# Patient Record
Sex: MALE | Race: WHITE | Employment: STUDENT | URBAN - METROPOLITAN AREA
[De-identification: names, ages, dates, MRNs, and addresses within clinical notes are randomized per-mention and may not be internally consistent; named-entity substitution may affect disease eponyms.]

---

## 2018-07-31 ENCOUNTER — OFFICE VISIT (OUTPATIENT)
Dept: FAMILY MEDICINE CLINIC | Age: 23
End: 2018-07-31
Payer: COMMERCIAL

## 2018-07-31 ENCOUNTER — HOSPITAL ENCOUNTER (OUTPATIENT)
Age: 23
Discharge: HOME | End: 2018-08-02
Payer: COMMERCIAL

## 2018-07-31 VITALS
DIASTOLIC BLOOD PRESSURE: 56 MMHG | HEART RATE: 79 BPM | BODY MASS INDEX: 25.2 KG/M2 | HEIGHT: 70 IN | RESPIRATION RATE: 16 BRPM | OXYGEN SATURATION: 98 % | SYSTOLIC BLOOD PRESSURE: 98 MMHG | TEMPERATURE: 97.7 F | WEIGHT: 176 LBS

## 2018-07-31 DIAGNOSIS — Z13.31 DEPRESSION SCREEN: ICD-10-CM

## 2018-07-31 DIAGNOSIS — Z00.00 ENCOUNTER FOR ANNUAL HEALTH EXAMINATION: Primary | ICD-10-CM

## 2018-07-31 DIAGNOSIS — E03.8 SUBCLINICAL HYPOTHYROIDISM: ICD-10-CM

## 2018-07-31 LAB
ALBUMIN SERPL-MCNC: 3.8 G/DL (ref 3.5–5.2)
ALP BLD-CCNC: 35 U/L (ref 40–129)
ALT SERPL-CCNC: 10 U/L (ref 0–40)
ANION GAP SERPL CALCULATED.3IONS-SCNC: 13 MMOL/L (ref 7–16)
AST SERPL-CCNC: 26 U/L (ref 0–39)
BASOPHILS ABSOLUTE: 0.05 E9/L (ref 0–0.2)
BASOPHILS RELATIVE PERCENT: 0.6 % (ref 0–2)
BILIRUB SERPL-MCNC: 0.3 MG/DL (ref 0–1.2)
BUN BLDV-MCNC: 22 MG/DL (ref 6–20)
CALCIUM SERPL-MCNC: 9.3 MG/DL (ref 8.6–10.2)
CHLORIDE BLD-SCNC: 103 MMOL/L (ref 98–107)
CHOLESTEROL, TOTAL: 152 MG/DL (ref 0–199)
CO2: 26 MMOL/L (ref 22–29)
CREAT SERPL-MCNC: 1.2 MG/DL (ref 0.7–1.2)
EOSINOPHILS ABSOLUTE: 0.23 E9/L (ref 0.05–0.5)
EOSINOPHILS RELATIVE PERCENT: 2.6 % (ref 0–6)
GFR AFRICAN AMERICAN: >60
GFR NON-AFRICAN AMERICAN: >60 ML/MIN/1.73
GLUCOSE BLD-MCNC: 78 MG/DL (ref 74–109)
HCT VFR BLD CALC: 51.4 % (ref 37–54)
HDLC SERPL-MCNC: 36 MG/DL
HEMOGLOBIN: 16.4 G/DL (ref 12.5–16.5)
IMMATURE GRANULOCYTES #: 0.03 E9/L
IMMATURE GRANULOCYTES %: 0.3 % (ref 0–5)
LDL CHOLESTEROL CALCULATED: 84 MG/DL (ref 0–99)
LYMPHOCYTES ABSOLUTE: 3.2 E9/L (ref 1.5–4)
LYMPHOCYTES RELATIVE PERCENT: 36.9 % (ref 20–42)
MCH RBC QN AUTO: 30.4 PG (ref 26–35)
MCHC RBC AUTO-ENTMCNC: 31.9 % (ref 32–34.5)
MCV RBC AUTO: 95.4 FL (ref 80–99.9)
MONOCYTES ABSOLUTE: 0.89 E9/L (ref 0.1–0.95)
MONOCYTES RELATIVE PERCENT: 10.3 % (ref 2–12)
NEUTROPHILS ABSOLUTE: 4.28 E9/L (ref 1.8–7.3)
NEUTROPHILS RELATIVE PERCENT: 49.3 % (ref 43–80)
PDW BLD-RTO: 11.9 FL (ref 11.5–15)
PLATELET # BLD: 259 E9/L (ref 130–450)
PMV BLD AUTO: 10.3 FL (ref 7–12)
POTASSIUM SERPL-SCNC: 4.9 MMOL/L (ref 3.5–5)
RBC # BLD: 5.39 E12/L (ref 3.8–5.8)
SODIUM BLD-SCNC: 142 MMOL/L (ref 132–146)
T4 TOTAL: 7.8 MCG/DL (ref 4.5–11.7)
TOTAL PROTEIN: 6 G/DL (ref 6.4–8.3)
TRIGL SERPL-MCNC: 162 MG/DL (ref 0–149)
TSH SERPL DL<=0.05 MIU/L-ACNC: 4.41 UIU/ML (ref 0.27–4.2)
VLDLC SERPL CALC-MCNC: 32 MG/DL
WBC # BLD: 8.7 E9/L (ref 4.5–11.5)

## 2018-07-31 PROCEDURE — 80061 LIPID PANEL: CPT

## 2018-07-31 PROCEDURE — G8419 CALC BMI OUT NRM PARAM NOF/U: HCPCS | Performed by: FAMILY MEDICINE

## 2018-07-31 PROCEDURE — 80053 COMPREHEN METABOLIC PANEL: CPT

## 2018-07-31 PROCEDURE — 36415 COLL VENOUS BLD VENIPUNCTURE: CPT | Performed by: FAMILY MEDICINE

## 2018-07-31 PROCEDURE — 84436 ASSAY OF TOTAL THYROXINE: CPT

## 2018-07-31 PROCEDURE — 85025 COMPLETE CBC W/AUTO DIFF WBC: CPT

## 2018-07-31 PROCEDURE — 99214 OFFICE O/P EST MOD 30 MIN: CPT | Performed by: FAMILY MEDICINE

## 2018-07-31 PROCEDURE — 84443 ASSAY THYROID STIM HORMONE: CPT

## 2018-07-31 PROCEDURE — G8427 DOCREV CUR MEDS BY ELIG CLIN: HCPCS | Performed by: FAMILY MEDICINE

## 2018-07-31 PROCEDURE — 1036F TOBACCO NON-USER: CPT | Performed by: FAMILY MEDICINE

## 2018-07-31 ASSESSMENT — PATIENT HEALTH QUESTIONNAIRE - PHQ9
2. FEELING DOWN, DEPRESSED OR HOPELESS: 0
SUM OF ALL RESPONSES TO PHQ QUESTIONS 1-9: 0
SUM OF ALL RESPONSES TO PHQ9 QUESTIONS 1 & 2: 0
1. LITTLE INTEREST OR PLEASURE IN DOING THINGS: 0

## 2018-07-31 NOTE — PROGRESS NOTES
ANNUAL PHYSICAL      Chief Complaint:   Denise Fountain is a 21 y.o. male who presents for complete physical examination  NO CURRENT PROBLEM OR SYMPTOMS. History of Present Illness:    LAST DENTAL EXAM-NOT SURE. LAST EYE EXAM- ABLE TO SEE WELL. Health Maintenance   Topic Date Due    HIV screen  01/26/2010    Flu vaccine (1) 09/01/2018    DTaP/Tdap/Td vaccine (2 - Td) 11/22/2026        There is no problem list on file for this patient. Past Medical History:   Diagnosis Date    Asthma     Environmental allergies     Latex allergy        Past Surgical History:   Procedure Laterality Date    APPENDECTOMY      TYMPANOSTOMY TUBE PLACEMENT         Current Outpatient Prescriptions   Medication Sig Dispense Refill    loratadine (CLARITIN) 10 MG tablet Take 10 mg by mouth daily as needed.  vitamin D (CHOLECALCIFEROL) 1000 UNIT TABS tablet Take 1,000 Units by mouth daily.  therapeutic multivitamin-minerals (THERAGRAN-M) tablet Take 1 tablet by mouth daily. No current facility-administered medications for this visit. Allergies   Allergen Reactions    Latex     Cefzil [Cefprozil]      Immunization History   Administered Date(s) Administered    Influenza, Intradermal, Preservative free 12/08/2015    Tdap (Boostrix, Adacel) 11/22/2016      Social History     Social History    Marital status: Single     Spouse name: N/A    Number of children: N/A    Years of education: N/A     Social History Main Topics    Smoking status: Never Smoker    Smokeless tobacco: Never Used    Alcohol use No    Drug use: No    Sexual activity: Not Currently     Other Topics Concern    None     Social History Narrative    None     No family history on file.                          Review Of Systems  Skin: no abnormal pigmentation, rash, scaling, itching, masses, hair or nail changes  Eyes: no blurring, diplopia, or eye pain  Ears/Nose/Throat: no hearing loss, tinnitus, vertigo, nosebleed, nasal congestion, rhinorrhea, sore throat  Respiratory: no cough, pleuritic chest pain, dyspnea, or wheezing  Cardiovascular: no angina, BRYANT, orthopnea, PND, palpitations, or claudication  Gastrointestinal: no nausea, vomiting, heartburn, diarrhea, constipation, bloating, or abdominal pain  Genitourinary: no urinary urgency, frequency, dysuria, nocturia, hesitancy, or incontinence  Musculoskeletal: no arthritis, arthralgia, myalgia, weakness, or morning stiffness  Neurologic: no paralysis, paresis, paresthesia, seizures, tremors, or headaches  Hematologic/Lymphatic/Immunologic: no anemia, abnormal bleeding/bruising, fever, chills, night sweats, swollen glands, or unexplained weight loss  Endocrine: no heat or cold intolerance and no polyphagia, polydipsia, or polyuria    PHYSICAL EXAMINATION:  BP (!) 98/56   Pulse 79   Temp 97.7 °F (36.5 °C) (Temporal)   Resp 16   Ht 5' 10\" (1.778 m)   Wt 176 lb (79.8 kg)   SpO2 98%   BMI 25.25 kg/m²   General appearance: healthy, alert, no distress  Skin: Skin color, texture, turgor normal. No rashes or lesions. No induration or tightening palpated. Head: Normocephalic. No masses, lesions, tenderness or abnormalities  Eyes: conjunctivae/corneas clear. PERRL, EOM's intact. Fundi are normal, no papilledema, hemorrhages or exudates. No AV crossing changes are noted. Ears: External ears normal. Canals clear. TM's clear bilaterally. Hearing normal to finger rub. Nose/Sinuses: Nares normal. Septum midline. Mucosa normal. No drainage or sinus tenderness. Oropharynx: Lips, mucosa, and tongue normal. Teeth and gums normal. Oropharynx clear with no exudate seen. Neck: Neck supple, and symmetric. No adenopathy. Thyroid symmetric, normal size, without nodule. Trachea is midline. Back: Back symmetric, no curvature. ROM normal. No CVA tenderness. Lungs: Good diaphragmatic excursion. Lungs clear to auscultation bilaterally.   No retractions or use of accessory muscles. No tactile fremitus. Normal chest percussion. Heart: PMI is not displaced, and no thrill noted. Regular rate and rhythm, with no rub, murmur or gallop noted. Abdomen: Abdomen soft, non-tender. BS normal. No masses, organomegaly. No hernia noted. Inguinal Area: Penis and Scrotum are normal.Inguinal rings are intact. Extremities: Extremities normal. No deformities, edema, or skin discoloration. No cyanosis or clubbing noted to the nails. Lymph: No lymphadenopathy of the neck or supraclavicular regions. Musculoskeletal: Spine ROM normal. Muscular strength intact. Peripheral pulses: radial=4/4, femoral=4/4, dorsalis pedis=4/4,  Neuro: Cranial nerves intact, Gait normal. Reflexes normal and symmetric, with no pathologic reflex noted. No focal weakness. Normal sensation to light touch. ASSESSMENT:     Diagnosis Orders   1. Encounter for annual health examination     2. Depression screen     3. Subclinical hypothyroidism  Comprehensive Metabolic Panel    CBC Auto Differential    Lipid Panel    TSH without Reflex    T4       Plan:    Patient Instructions   REGULAR  DIET. REGULAR EXERCISE  ADVISED. BLOOD DRAW FOR LAB. TESTING. NEXT APPOINTMENT IN 1 YEAR OR AS NEEDED               Return in about 1 year (around 7/31/2019) for [employer].       I have reviewed my findings and recommendations with Heather Scherer.     Dann Waldrop M.D

## 2018-07-31 NOTE — PATIENT INSTRUCTIONS
REGULAR  DIET. REGULAR EXERCISE  ADVISED. BLOOD DRAW FOR LAB. TESTING.   NEXT APPOINTMENT IN 1 YEAR OR AS NEEDED

## 2018-08-01 RX ORDER — LEVOTHYROXINE SODIUM 25 MCG
25 TABLET ORAL DAILY
Qty: 30 TABLET | Refills: 3 | Status: SHIPPED | OUTPATIENT
Start: 2018-08-01 | End: 2019-02-20 | Stop reason: SDUPTHER

## 2018-12-20 ENCOUNTER — OFFICE VISIT (OUTPATIENT)
Dept: FAMILY MEDICINE CLINIC | Age: 23
End: 2018-12-20
Payer: COMMERCIAL

## 2018-12-20 ENCOUNTER — HOSPITAL ENCOUNTER (OUTPATIENT)
Age: 23
Discharge: HOME | End: 2018-12-22
Payer: COMMERCIAL

## 2018-12-20 VITALS
OXYGEN SATURATION: 98 % | HEART RATE: 59 BPM | RESPIRATION RATE: 14 BRPM | SYSTOLIC BLOOD PRESSURE: 104 MMHG | TEMPERATURE: 97.8 F | WEIGHT: 180.12 LBS | BODY MASS INDEX: 25.79 KG/M2 | DIASTOLIC BLOOD PRESSURE: 54 MMHG | HEIGHT: 70 IN

## 2018-12-20 DIAGNOSIS — E78.00 HYPERCHOLESTEREMIA: ICD-10-CM

## 2018-12-20 DIAGNOSIS — E03.9 ACQUIRED HYPOTHYROIDISM: ICD-10-CM

## 2018-12-20 DIAGNOSIS — N52.8 OTHER MALE ERECTILE DYSFUNCTION: ICD-10-CM

## 2018-12-20 DIAGNOSIS — Z23 NEED FOR INFLUENZA VACCINATION: Primary | ICD-10-CM

## 2018-12-20 LAB
CHOLESTEROL, TOTAL: 167 MG/DL (ref 0–199)
HDLC SERPL-MCNC: 60 MG/DL
LDL CHOLESTEROL CALCULATED: 91 MG/DL (ref 0–99)
T4 TOTAL: 7.9 MCG/DL (ref 4.5–11.7)
TRIGL SERPL-MCNC: 79 MG/DL (ref 0–149)
TSH SERPL DL<=0.05 MIU/L-ACNC: 3.24 UIU/ML (ref 0.27–4.2)
VLDLC SERPL CALC-MCNC: 16 MG/DL

## 2018-12-20 PROCEDURE — 36415 COLL VENOUS BLD VENIPUNCTURE: CPT | Performed by: FAMILY MEDICINE

## 2018-12-20 PROCEDURE — 90471 IMMUNIZATION ADMIN: CPT | Performed by: FAMILY MEDICINE

## 2018-12-20 PROCEDURE — 90688 IIV4 VACCINE SPLT 0.5 ML IM: CPT | Performed by: FAMILY MEDICINE

## 2018-12-20 PROCEDURE — 80061 LIPID PANEL: CPT

## 2018-12-20 PROCEDURE — G8427 DOCREV CUR MEDS BY ELIG CLIN: HCPCS | Performed by: FAMILY MEDICINE

## 2018-12-20 PROCEDURE — 84443 ASSAY THYROID STIM HORMONE: CPT

## 2018-12-20 PROCEDURE — G8482 FLU IMMUNIZE ORDER/ADMIN: HCPCS | Performed by: FAMILY MEDICINE

## 2018-12-20 PROCEDURE — 1036F TOBACCO NON-USER: CPT | Performed by: FAMILY MEDICINE

## 2018-12-20 PROCEDURE — G8419 CALC BMI OUT NRM PARAM NOF/U: HCPCS | Performed by: FAMILY MEDICINE

## 2018-12-20 PROCEDURE — 84436 ASSAY OF TOTAL THYROXINE: CPT

## 2018-12-20 PROCEDURE — 99213 OFFICE O/P EST LOW 20 MIN: CPT | Performed by: FAMILY MEDICINE

## 2018-12-20 NOTE — PROGRESS NOTES
OFFICE PROGRESS NOTE      SUBJECTIVE:        Patient ID:   Claribel Perez is a 21 y.o. male who presents for   Chief Complaint   Patient presents with    Results     lab results    Thyroid Problem    Immunizations     flu vaccine requested           HPI:     FEELS GOOD. HAS PROBLEM MAINTAINING ERECTION FOR PERFORMANCE. WATCHING DIET GOOD. WALKING FOR EXERCISE. TAKING MEDICATIONS REGULARLY. FASTING FOR LAB WORK         Prior to Admission medications    Medication Sig Start Date End Date Taking? Authorizing Provider   SYNTHROID 25 MCG tablet Take 1 tablet by mouth Daily 8/1/18   Ninoska Rodriguez MD   loratadine (CLARITIN) 10 MG tablet Take 10 mg by mouth daily as needed. Historical Provider, MD   vitamin D (CHOLECALCIFEROL) 1000 UNIT TABS tablet Take 1,000 Units by mouth daily. Historical Provider, MD   therapeutic multivitamin-minerals (THERAGRAN-M) tablet Take 1 tablet by mouth daily.     Historical Provider, MD     Social History     Social History    Marital status: Single     Spouse name: N/A    Number of children: N/A    Years of education: N/A     Social History Main Topics    Smoking status: Never Smoker    Smokeless tobacco: Never Used    Alcohol use No    Drug use: No    Sexual activity: Not Currently     Other Topics Concern    None     Social History Narrative    None       I have reviewed León's allergies, medications, problem list, medical, social and family history and have updated as needed in the electronic medical record  Review Of Systems:    Skin: no abnormal pigmentation, rash, scaling, itching, masses, hair or nail changes  Eyes: no blurring, diplopia, or eye pain  Ears/Nose/Throat: no hearing loss, tinnitus, vertigo, nosebleed, nasal congestion, rhinorrhea, sore throat  Respiratory: no cough, pleuritic chest pain, dyspnea, or wheezing  Cardiovascular: no chest pain, angina, dyspnea on exertion, orthopnea, PND, palpitations, or claudication  Gastrointestinal: no nausea, vomiting, heartburn, diarrhea, constipation, bloating,  abdominal pain, or blood per rectum. Appetite is good  Genitourinary: no urinary urgency, frequency, dysuria, nocturia, hesitancy, or incontinence  Musculoskeletal:  Ambulating well  Neurologic: no paralysis, paresis, paresthesia, seizures, tremors, or headaches  Hematologic/Lymphatic/Immunologic: no anemia, abnormal bleeding/bruising, fever, chills, night sweats, swollen glands, or unexplained weight loss  Endocrine: no heat or cold intolerance and no polyphagia, polydipsia, or polyuria        OBJECTIVE:     VS:  Wt Readings from Last 3 Encounters:   12/20/18 180 lb 1.9 oz (81.7 kg)   07/31/18 176 lb (79.8 kg)   11/22/16 190 lb (86.2 kg)     Temp Readings from Last 3 Encounters:   12/20/18 97.8 °F (36.6 °C) (Temporal)   07/31/18 97.7 °F (36.5 °C) (Temporal)   11/22/16 98 °F (36.7 °C) (Oral)     BP Readings from Last 3 Encounters:   12/20/18 (!) 104/54   07/31/18 (!) 98/56   11/22/16 128/72        General appearance: Alert, Awake, Oriented times 3, no distress  Skin: Warm and dry  Head: Normocephalic. No masses, lesions or tenderness noted  Eyes: Conjunctivae appear normal. PERLE  Ears: External ears normal  Nose/Sinuses: Nares normal. Septum midline. Mucosa normal. No drainage  Oropharynx: Oropharynx clear with no exudate seen  Neck: Neck supple. No jugular venous distension, lymphadenopathy or thyromegaly Trachea midline  Lungs: Lungs clear to auscultation bilaterally. No ronchi, crackles or wheezes  Heart: S1 S2  Regular rate and rhythm. No rub, murmur or gallop  Abdomen: Abdomen soft, non-tender. BS normal. No masses, organomegaly  Extremities:NORMAL. Pedal pulses are normal.  Musculoskeletal: Muscular strength appears intact.  No joint effusion, tenderness, swelling or warmth  Neuro:  No focal motor or sensory deficits        ASSESSMENT     Patient Active Problem List    Diagnosis Date Noted    Acquired

## 2019-02-21 RX ORDER — LEVOTHYROXINE SODIUM 25 MCG
25 TABLET ORAL DAILY
Qty: 90 TABLET | Refills: 1 | Status: SHIPPED | OUTPATIENT
Start: 2019-02-21 | End: 2019-09-04

## 2019-09-04 ENCOUNTER — OFFICE VISIT (OUTPATIENT)
Dept: PRIMARY CARE CLINIC | Age: 24
End: 2019-09-04

## 2019-09-04 VITALS
RESPIRATION RATE: 15 BRPM | WEIGHT: 189 LBS | SYSTOLIC BLOOD PRESSURE: 122 MMHG | OXYGEN SATURATION: 100 % | DIASTOLIC BLOOD PRESSURE: 82 MMHG | HEART RATE: 67 BPM | HEIGHT: 70 IN | BODY MASS INDEX: 27.06 KG/M2

## 2019-09-04 DIAGNOSIS — H65.92 LEFT NON-SUPPURATIVE OTITIS MEDIA: Primary | ICD-10-CM

## 2019-09-04 PROCEDURE — 99213 OFFICE O/P EST LOW 20 MIN: CPT | Performed by: NURSE PRACTITIONER

## 2019-09-04 RX ORDER — DOXYCYCLINE HYCLATE 100 MG/1
100 CAPSULE ORAL 2 TIMES DAILY
Qty: 20 CAPSULE | Refills: 0 | Status: SHIPPED | OUTPATIENT
Start: 2019-09-04 | End: 2019-09-14

## 2019-09-04 RX ORDER — PSEUDOEPHEDRINE HYDROCHLORIDE 30 MG/1
30 TABLET ORAL EVERY 6 HOURS PRN
Qty: 30 TABLET | Refills: 0 | Status: SHIPPED | OUTPATIENT
Start: 2019-09-04 | End: 2020-09-03

## 2019-09-04 ASSESSMENT — ENCOUNTER SYMPTOMS
VOMITING: 0
SINUS PRESSURE: 0
SORE THROAT: 0
DIARRHEA: 0
SINUS PAIN: 0
FACIAL SWELLING: 0
NAUSEA: 0
CONSTIPATION: 0
SHORTNESS OF BREATH: 0
WHEEZING: 0
COUGH: 0

## 2019-10-31 ENCOUNTER — APPOINTMENT (OUTPATIENT)
Dept: GENERAL RADIOLOGY | Age: 24
End: 2019-10-31
Payer: COMMERCIAL

## 2019-10-31 ENCOUNTER — HOSPITAL ENCOUNTER (EMERGENCY)
Age: 24
Discharge: HOME | End: 2019-10-31
Payer: COMMERCIAL

## 2019-10-31 VITALS
BODY MASS INDEX: 26.36 KG/M2 | OXYGEN SATURATION: 97 % | RESPIRATION RATE: 16 BRPM | SYSTOLIC BLOOD PRESSURE: 134 MMHG | TEMPERATURE: 98.7 F | DIASTOLIC BLOOD PRESSURE: 90 MMHG | HEIGHT: 71 IN | HEART RATE: 78 BPM

## 2019-10-31 DIAGNOSIS — S93.401A SPRAIN OF RIGHT ANKLE, UNSPECIFIED LIGAMENT, INITIAL ENCOUNTER: Primary | ICD-10-CM

## 2019-10-31 PROCEDURE — 99283 EMERGENCY DEPT VISIT LOW MDM: CPT

## 2019-10-31 PROCEDURE — 73630 X-RAY EXAM OF FOOT: CPT

## 2019-10-31 PROCEDURE — 73610 X-RAY EXAM OF ANKLE: CPT

## 2019-10-31 PROCEDURE — 6370000000 HC RX 637 (ALT 250 FOR IP): Performed by: NURSE PRACTITIONER

## 2019-10-31 RX ORDER — IBUPROFEN 800 MG/1
800 TABLET ORAL ONCE
Status: COMPLETED | OUTPATIENT
Start: 2019-10-31 | End: 2019-10-31

## 2019-10-31 RX ORDER — IBUPROFEN 600 MG/1
600 TABLET ORAL EVERY 8 HOURS PRN
Qty: 21 TABLET | Refills: 0 | Status: SHIPPED | OUTPATIENT
Start: 2019-10-31 | End: 2022-04-06

## 2019-10-31 RX ADMIN — IBUPROFEN 800 MG: 800 TABLET, FILM COATED ORAL at 16:08

## 2019-10-31 ASSESSMENT — PAIN DESCRIPTION - LOCATION: LOCATION: ANKLE

## 2019-10-31 ASSESSMENT — PAIN DESCRIPTION - ORIENTATION: ORIENTATION: RIGHT

## 2019-10-31 ASSESSMENT — PAIN SCALES - GENERAL
PAINLEVEL_OUTOF10: 5
PAINLEVEL_OUTOF10: 4

## 2019-10-31 ASSESSMENT — PAIN DESCRIPTION - PAIN TYPE: TYPE: ACUTE PAIN

## 2020-12-14 ENCOUNTER — OFFICE VISIT (OUTPATIENT)
Dept: PRIMARY CARE CLINIC | Age: 25
End: 2020-12-14
Payer: COMMERCIAL

## 2020-12-14 VITALS
TEMPERATURE: 98.9 F | HEART RATE: 77 BPM | OXYGEN SATURATION: 99 % | BODY MASS INDEX: 26.36 KG/M2 | RESPIRATION RATE: 16 BRPM | WEIGHT: 189 LBS | DIASTOLIC BLOOD PRESSURE: 72 MMHG | SYSTOLIC BLOOD PRESSURE: 108 MMHG

## 2020-12-14 LAB
Lab: NORMAL
QC PASS/FAIL: NORMAL
SARS-COV-2, POC: DETECTED

## 2020-12-14 PROCEDURE — G8484 FLU IMMUNIZE NO ADMIN: HCPCS | Performed by: PHYSICIAN ASSISTANT

## 2020-12-14 PROCEDURE — 99213 OFFICE O/P EST LOW 20 MIN: CPT | Performed by: PHYSICIAN ASSISTANT

## 2020-12-14 PROCEDURE — G8419 CALC BMI OUT NRM PARAM NOF/U: HCPCS | Performed by: PHYSICIAN ASSISTANT

## 2020-12-14 PROCEDURE — 87426 SARSCOV CORONAVIRUS AG IA: CPT | Performed by: PHYSICIAN ASSISTANT

## 2020-12-14 PROCEDURE — 1036F TOBACCO NON-USER: CPT | Performed by: PHYSICIAN ASSISTANT

## 2020-12-14 PROCEDURE — G8427 DOCREV CUR MEDS BY ELIG CLIN: HCPCS | Performed by: PHYSICIAN ASSISTANT

## 2020-12-14 NOTE — PROGRESS NOTES
Chief Complaint   Fatigue (started last week), Generalized Body Aches, Other (loss of taste and smell), and Sweats      History of Present Illness   Source of history provided by: patient. Adenike Tapia is a 22 y.o. old male who has a past medical history of:   Past Medical History:   Diagnosis Date    Acquired hypothyroidism 12/20/2018    Asthma     Environmental allergies     Hypercholesteremia 12/20/2018    Latex allergy     Presents to the flu clinic for evaluation of body aches, fatigue, facial pressure, sweats, and loss of taste/smell x 5 days. Denies any CP, dyspnea, LE edema, abdominal pain, vomiting, rash, or lethargy. Denies fever or NVD. Has been taking Sudafed without symptomatic relief. Unknown contact with individuals with known COVID-19 infection or under investigation for COVID-19 infection. Denies any hx of asthma or COPD. Denies hx of tobacco use. ROS   Pertinent positives and negatives are stated within HPI, all other systems reviewed and are negative. Surgical History:  has a past surgical history that includes Appendectomy and Tympanostomy tube placement. Social History:  reports that he has never smoked. He has never used smokeless tobacco. He reports that he does not drink alcohol or use drugs. Family History: family history is not on file. Allergies: Latex and Cefzil [cefprozil]    Physical Exam      VS:  /72   Pulse 77   Temp 98.9 °F (37.2 °C)   Resp 16   Wt 189 lb (85.7 kg)   SpO2 99%   BMI 26.36 kg/m²    Oxygen Saturation Interpretation: Normal.    Constitutional:  Alert, development consistent with age. NAD. Head:  NC/NT. Airway patent. Ears: TMs normal bilaterally. Canals without exudate or swelling bilaterally. Mouth: Posterior pharynx with mild erythema and clear postnasal drip. No tonsillar hypertrophy or exudate. Neck:  Normal ROM. Supple. No anterior cervical adenopathy noted. Lungs: CTAB without wheezes, rales, or rhonchi.    CV:  Regular rate and rhythm, normal heart sounds, without pathological murmurs, ectopy, gallops, or rubs. Skin:  Normal turgor. Warm, dry, without visible rash. Lymphatic: No lymphangitis or adenopathy noted. Neurological:  Oriented. Motor functions intact. Lab / Imaging Results   (All laboratory and radiology results have been personally reviewed by myself)  Labs:  Results for orders placed or performed in visit on 12/14/20   POCT COVID-19, Antigen   Result Value Ref Range    SARS-COV-2, POC Detected Not Detected    Lot Number [id number]     QC Pass/Fail pass      Imaging: All Radiology results interpreted by Radiologist unless otherwise noted. No results found. Medical Decision Making   Pt non-toxic, in no apparent distress and stable at time of discharge. Assessment/Plan   Peter Davila was seen today for fatigue, generalized body aches, other and sweats. Diagnoses and all orders for this visit:    COVID-19  -     POCT COVID-19, Antigen      Rapid COVID positive. Strict 10 day quarantine. Pt should also be fever free for 24 hours and symptoms should be improved overall prior to returning to work if applicable. Increase fluids and rest. Symptomatic relief discussed including Tylenol prn pain/fever. Schedule virtual f/u with PCP in 7-10 days if symptoms persist. ED sooner if symptoms worsen or change. ED immediately with high or refractory fever, progressive SOB, dyspnea, CP, calf pain/swelling, shaking chills, vomiting, abdominal pain, lethargy, flank pain, or decreased urinary output. Pt verbalizes understanding and is in agreement with plan of care. All questions answered. Oleh Gaucher, PA-C    This visit was provided as a focused evaluation during the COVID -19 pandemic/national emergency. A comprehensive review of all previous patient history and testing was not conducted. Pertinent findings were elicited during the visit.

## 2021-04-14 ENCOUNTER — HOSPITAL ENCOUNTER (OUTPATIENT)
Age: 26
Discharge: HOME | End: 2021-04-14
Payer: COMMERCIAL

## 2021-04-14 ENCOUNTER — OFFICE VISIT (OUTPATIENT)
Dept: PRIMARY CARE CLINIC | Age: 26
End: 2021-04-14
Payer: COMMERCIAL

## 2021-04-14 VITALS
HEART RATE: 80 BPM | SYSTOLIC BLOOD PRESSURE: 119 MMHG | BODY MASS INDEX: 26.32 KG/M2 | OXYGEN SATURATION: 98 % | HEIGHT: 71 IN | TEMPERATURE: 98.2 F | WEIGHT: 188 LBS | DIASTOLIC BLOOD PRESSURE: 80 MMHG

## 2021-04-14 DIAGNOSIS — G89.29 CHRONIC NONINTRACTABLE HEADACHE, UNSPECIFIED HEADACHE TYPE: Primary | ICD-10-CM

## 2021-04-14 DIAGNOSIS — R51.9 CHRONIC NONINTRACTABLE HEADACHE, UNSPECIFIED HEADACHE TYPE: ICD-10-CM

## 2021-04-14 DIAGNOSIS — G89.29 CHRONIC NONINTRACTABLE HEADACHE, UNSPECIFIED HEADACHE TYPE: ICD-10-CM

## 2021-04-14 DIAGNOSIS — R51.9 CHRONIC NONINTRACTABLE HEADACHE, UNSPECIFIED HEADACHE TYPE: Primary | ICD-10-CM

## 2021-04-14 LAB
ALBUMIN SERPL-MCNC: 5.1 G/DL (ref 3.5–5.2)
ALP BLD-CCNC: 53 U/L (ref 40–129)
ALT SERPL-CCNC: 14 U/L (ref 0–40)
ANION GAP SERPL CALCULATED.3IONS-SCNC: 13 MMOL/L (ref 7–16)
AST SERPL-CCNC: 13 U/L (ref 0–39)
BASOPHILS ABSOLUTE: 0.03 E9/L (ref 0–0.2)
BASOPHILS RELATIVE PERCENT: 0.4 % (ref 0–2)
BILIRUB SERPL-MCNC: 0.6 MG/DL (ref 0–1.2)
BUN BLDV-MCNC: 17 MG/DL (ref 6–20)
CALCIUM SERPL-MCNC: 10.4 MG/DL (ref 8.6–10.2)
CHLORIDE BLD-SCNC: 103 MMOL/L (ref 98–107)
CO2: 24 MMOL/L (ref 22–29)
CREAT SERPL-MCNC: 1 MG/DL (ref 0.7–1.2)
EOSINOPHILS ABSOLUTE: 0.11 E9/L (ref 0.05–0.5)
EOSINOPHILS RELATIVE PERCENT: 1.4 % (ref 0–6)
FERRITIN: 109 NG/ML
GFR AFRICAN AMERICAN: >60
GFR NON-AFRICAN AMERICAN: >60 ML/MIN/1.73
GLUCOSE BLD-MCNC: 98 MG/DL (ref 74–99)
HBA1C MFR BLD: 4.9 % (ref 4–5.6)
HCT VFR BLD CALC: 48.8 % (ref 37–54)
HEMOGLOBIN: 15.9 G/DL (ref 12.5–16.5)
IMMATURE GRANULOCYTES #: 0.04 E9/L
IMMATURE GRANULOCYTES %: 0.5 % (ref 0–5)
IRON SATURATION: 32 % (ref 20–55)
IRON: 112 MCG/DL (ref 59–158)
LYMPHOCYTES ABSOLUTE: 1.71 E9/L (ref 1.5–4)
LYMPHOCYTES RELATIVE PERCENT: 22.3 % (ref 20–42)
MCH RBC QN AUTO: 30.9 PG (ref 26–35)
MCHC RBC AUTO-ENTMCNC: 32.6 % (ref 32–34.5)
MCV RBC AUTO: 94.9 FL (ref 80–99.9)
MONOCYTES ABSOLUTE: 0.74 E9/L (ref 0.1–0.95)
MONOCYTES RELATIVE PERCENT: 9.6 % (ref 2–12)
NEUTROPHILS ABSOLUTE: 5.05 E9/L (ref 1.8–7.3)
NEUTROPHILS RELATIVE PERCENT: 65.8 % (ref 43–80)
PDW BLD-RTO: 11.8 FL (ref 11.5–15)
PLATELET # BLD: 277 E9/L (ref 130–450)
PMV BLD AUTO: 9.6 FL (ref 7–12)
POTASSIUM SERPL-SCNC: 4.1 MMOL/L (ref 3.5–5)
RBC # BLD: 5.14 E12/L (ref 3.8–5.8)
SODIUM BLD-SCNC: 140 MMOL/L (ref 132–146)
T4 FREE: 1.41 NG/DL (ref 0.93–1.7)
TOTAL IRON BINDING CAPACITY: 350 MCG/DL (ref 250–450)
TOTAL PROTEIN: 8 G/DL (ref 6.4–8.3)
TSH SERPL DL<=0.05 MIU/L-ACNC: 1.54 UIU/ML (ref 0.27–4.2)
WBC # BLD: 7.7 E9/L (ref 4.5–11.5)

## 2021-04-14 PROCEDURE — 86039 ANTINUCLEAR ANTIBODIES (ANA): CPT

## 2021-04-14 PROCEDURE — 80053 COMPREHEN METABOLIC PANEL: CPT

## 2021-04-14 PROCEDURE — 1036F TOBACCO NON-USER: CPT | Performed by: FAMILY MEDICINE

## 2021-04-14 PROCEDURE — 83550 IRON BINDING TEST: CPT

## 2021-04-14 PROCEDURE — 83655 ASSAY OF LEAD: CPT

## 2021-04-14 PROCEDURE — 84443 ASSAY THYROID STIM HORMONE: CPT

## 2021-04-14 PROCEDURE — 84439 ASSAY OF FREE THYROXINE: CPT

## 2021-04-14 PROCEDURE — 82175 ASSAY OF ARSENIC: CPT

## 2021-04-14 PROCEDURE — 83540 ASSAY OF IRON: CPT

## 2021-04-14 PROCEDURE — 83036 HEMOGLOBIN GLYCOSYLATED A1C: CPT

## 2021-04-14 PROCEDURE — 85025 COMPLETE CBC W/AUTO DIFF WBC: CPT

## 2021-04-14 PROCEDURE — 82728 ASSAY OF FERRITIN: CPT

## 2021-04-14 PROCEDURE — 99214 OFFICE O/P EST MOD 30 MIN: CPT | Performed by: FAMILY MEDICINE

## 2021-04-14 PROCEDURE — G8419 CALC BMI OUT NRM PARAM NOF/U: HCPCS | Performed by: FAMILY MEDICINE

## 2021-04-14 PROCEDURE — G8427 DOCREV CUR MEDS BY ELIG CLIN: HCPCS | Performed by: FAMILY MEDICINE

## 2021-04-14 PROCEDURE — 86038 ANTINUCLEAR ANTIBODIES: CPT

## 2021-04-14 PROCEDURE — 36415 COLL VENOUS BLD VENIPUNCTURE: CPT

## 2021-04-14 PROCEDURE — 83825 ASSAY OF MERCURY: CPT

## 2021-04-14 ASSESSMENT — PATIENT HEALTH QUESTIONNAIRE - PHQ9
2. FEELING DOWN, DEPRESSED OR HOPELESS: 1
1. LITTLE INTEREST OR PLEASURE IN DOING THINGS: 1
SUM OF ALL RESPONSES TO PHQ9 QUESTIONS 1 & 2: 2
SUM OF ALL RESPONSES TO PHQ QUESTIONS 1-9: 2
SUM OF ALL RESPONSES TO PHQ QUESTIONS 1-9: 2

## 2021-04-14 NOTE — PROGRESS NOTES
Caryle Constant  : 1995    Chief Complaint:     Chief Complaint   Patient presents with    [facility]     frontal headaches, having blurry vision-told in the past he might have astigmatism, phantom smells, metallic taste, mood swings/depression, night sweats     HPI  Seasonal allergies on claritin  Hypothyroidism? Not on medication  HLD  HA for the past years about once weekly. Right sided sharp/throbbing pain in anterior forehead radiating around temporal lobe. Brief pain. Feels his limbs are weak during episodes an off balance. Aura of metallic taste in mouth prior to HAs. Photophobia and phonophobia with episodes. No nausea or emesis. Has history of concussion in sports in middle school. No dizziness or vertigo w them. Some blurred vision with headaches alone. Wears glasses which are current, follows w OD. Past medical, surgical, family and social histories reviewed and updated today as appropriate    Health Maintenance Due   Topic Date Due    Hepatitis C screen  Never done    HIV screen  Never done    COVID-19 Vaccine (1) Never done       Current Outpatient Medications   Medication Sig Dispense Refill    vitamin D (CHOLECALCIFEROL) 1000 UNIT TABS tablet Take 1,000 Units by mouth daily.  therapeutic multivitamin-minerals (THERAGRAN-M) tablet Take 1 tablet by mouth daily.  ibuprofen (IBU) 600 MG tablet Take 1 tablet by mouth every 8 hours as needed for Pain Take with food. (Patient not taking: Reported on 2021) 21 tablet 0    loratadine (CLARITIN) 10 MG tablet Take 10 mg by mouth daily as needed. No current facility-administered medications for this visit. Allergies   Allergen Reactions    Latex Hives and Itching    Cefzil [Cefprozil] Hives         REVIEW OF SYSTEMS  Review of Systems   Constitutional: Negative for activity change, appetite change, chills, diaphoresis, fatigue, fever and unexpected weight change.    Eyes: Positive for photophobia and visual disturbance. Negative for redness. Respiratory: Negative for cough, chest tightness, shortness of breath and wheezing. Cardiovascular: Negative for chest pain, palpitations and leg swelling. Gastrointestinal: Negative for abdominal pain, blood in stool, constipation, diarrhea and vomiting. Endocrine: Negative for cold intolerance, heat intolerance, polydipsia, polyphagia and polyuria. Skin: Negative for color change, pallor and rash. Neurological: Positive for headaches. Negative for dizziness, tremors, seizures, syncope, facial asymmetry, weakness, light-headedness and numbness. Psychiatric/Behavioral: Negative for confusion and dysphoric mood. The patient is not nervous/anxious. All other systems reviewed and are negative. PHYSICAL EXAM  /80   Pulse 80   Temp 98.2 °F (36.8 °C)   Ht 5' 11\" (1.803 m)   Wt 188 lb (85.3 kg)   SpO2 98%   BMI 26.22 kg/m²   Physical Exam  Vitals signs reviewed. Constitutional:       General: He is not in acute distress. Appearance: Normal appearance. He is well-developed. He is not ill-appearing or diaphoretic. HENT:      Head: Normocephalic and atraumatic. Right Ear: External ear normal.      Left Ear: External ear normal.      Nose: Nose normal.      Mouth/Throat:      Pharynx: No oropharyngeal exudate. Eyes:      General: No scleral icterus. Conjunctiva/sclera: Conjunctivae normal.   Neck:      Musculoskeletal: Neck supple. Thyroid: No thyromegaly. Vascular: No JVD. Cardiovascular:      Rate and Rhythm: Normal rate and regular rhythm. Heart sounds: No murmur. No friction rub. No gallop. Pulmonary:      Effort: Pulmonary effort is normal. No respiratory distress. Breath sounds: Normal breath sounds. No wheezing or rales. Abdominal:      General: Bowel sounds are normal. There is no distension. Palpations: Abdomen is soft. Tenderness: There is no abdominal tenderness.    Lymphadenopathy: Cervical: No cervical adenopathy. Skin:     General: Skin is warm and dry. Coloration: Skin is not pale. Findings: No erythema or rash. Neurological:      Mental Status: He is alert and oriented to person, place, and time. Sensory: Sensation is intact. Motor: Motor function is intact. Coordination: Coordination is intact. Romberg sign negative. Finger-Nose-Finger Test normal. Rapid alternating movements normal.      Gait: Gait is intact. Psychiatric:         Behavior: Behavior normal.         Thought Content: Thought content normal.         Judgment: Judgment normal.                             ASSESSMENT/PLAN:     Diagnosis Orders   1. Chronic nonintractable headache, unspecified headache type  MRI BRAIN W WO CONTRAST    CBC Auto Differential    Comprehensive Metabolic Panel    TSH without Reflex    T4, Free    HEAVY METALS, BLOOD    Hemoglobin A1C    IRON AND TIBC    Ferritin    STAN     Check MRI, he's never had, r/o mass etc  Labs as above. R/o autoimmune. Suspect possible migraines, though doesn't exactly fit timing. Possible trigeminal pain? Follow up after tests. Reviewed age and gender appropriate health screening exams and vaccinations. Advised patient regarding importance of keeping up with recommended health maintenance and to schedule as soon as possible if overdue, as this is important in assessing for undiagnosed pathology, especially cancer. Patient verbalizes understanding and agrees. Call or go to ED immediately if symptoms worsen or persist.  No follow-ups on file. Sooner if necessary. Counseled regarding above diagnosis, including possible risks and complications,especially if left uncontrolled. Counseled regarding the possible side effects, risks, benefits and alternatives to treatment; patient and/or guardian verbalizes understanding. Advised patient to call with any newmedication issues. All questions answered.     Opal Hollis MD  4/14/21

## 2021-04-16 LAB
ANA PATTERN: ABNORMAL
ANA TITER: ABNORMAL
ANTI-NUCLEAR ANTIBODY (ANA): POSITIVE

## 2021-04-18 LAB
ARSENIC BLOOD: <10 UG/L
LEAD LEVEL BLOOD: <2 UG/DL
MERCURY BLOOD: <2.5 UG/L

## 2021-04-21 ENCOUNTER — HOSPITAL ENCOUNTER (OUTPATIENT)
Age: 26
Discharge: HOME | End: 2021-04-21
Payer: COMMERCIAL

## 2021-04-21 DIAGNOSIS — R76.8 POSITIVE ANA (ANTINUCLEAR ANTIBODY): ICD-10-CM

## 2021-04-21 LAB — C-REACTIVE PROTEIN: 0.3 MG/DL (ref 0–0.4)

## 2021-04-21 PROCEDURE — 86225 DNA ANTIBODY NATIVE: CPT

## 2021-04-21 PROCEDURE — 86235 NUCLEAR ANTIGEN ANTIBODY: CPT

## 2021-04-21 PROCEDURE — 86140 C-REACTIVE PROTEIN: CPT

## 2021-04-21 PROCEDURE — 86226 DNA ANTIBODY SINGLE STRAND: CPT

## 2021-04-21 PROCEDURE — 36415 COLL VENOUS BLD VENIPUNCTURE: CPT

## 2021-04-21 PROCEDURE — 83516 IMMUNOASSAY NONANTIBODY: CPT

## 2021-04-22 LAB
ANTI DNA DOUBLE STRANDED: NEGATIVE
ENA TO RNP ANTIBODY: NEGATIVE
ENA TO SMITH (SM) ANTIBODY: NEGATIVE
ENA TO SSA (RO) ANTIBODY: NEGATIVE
ENA TO SSB (LA) ANTIBODY: NEGATIVE
SCLERODERMA (SCL-70) AB: NEGATIVE

## 2021-04-23 ENCOUNTER — HOSPITAL ENCOUNTER (OUTPATIENT)
Dept: MRI IMAGING | Age: 26
Discharge: HOME | End: 2021-04-25
Payer: COMMERCIAL

## 2021-04-23 DIAGNOSIS — G89.29 CHRONIC NONINTRACTABLE HEADACHE, UNSPECIFIED HEADACHE TYPE: ICD-10-CM

## 2021-04-23 DIAGNOSIS — R51.9 CHRONIC NONINTRACTABLE HEADACHE, UNSPECIFIED HEADACHE TYPE: ICD-10-CM

## 2021-04-23 PROCEDURE — 6360000004 HC RX CONTRAST MEDICATION: Performed by: RADIOLOGY

## 2021-04-23 PROCEDURE — A9579 GAD-BASE MR CONTRAST NOS,1ML: HCPCS | Performed by: RADIOLOGY

## 2021-04-23 PROCEDURE — 70553 MRI BRAIN STEM W/O & W/DYE: CPT

## 2021-04-23 RX ADMIN — GADOTERIDOL 17 ML: 279.3 INJECTION, SOLUTION INTRAVENOUS at 16:01

## 2021-04-24 LAB — HISTONE ANTIBODY IGG: 0.9 UNITS (ref 0–0.9)

## 2021-04-29 LAB
Lab: NORMAL
REPORT: NORMAL
THIS TEST SENT TO: NORMAL

## 2021-04-29 NOTE — RESULT ENCOUNTER NOTE
Follow up antibodies are normal. Does he want to see a Rheumatologist still for follow up? MRI is fine aside from sinus inflammation. Does he want to trial meds for migraines?

## 2021-05-03 ASSESSMENT — ENCOUNTER SYMPTOMS
ABDOMINAL PAIN: 0
DIARRHEA: 0
COUGH: 0
WHEEZING: 0
CHEST TIGHTNESS: 0
SHORTNESS OF BREATH: 0
COLOR CHANGE: 0
EYE REDNESS: 0
BLOOD IN STOOL: 0
VOMITING: 0
PHOTOPHOBIA: 1
CONSTIPATION: 0

## 2022-04-06 ENCOUNTER — OFFICE VISIT (OUTPATIENT)
Dept: PRIMARY CARE CLINIC | Age: 27
End: 2022-04-06

## 2022-04-06 VITALS
TEMPERATURE: 98.2 F | SYSTOLIC BLOOD PRESSURE: 110 MMHG | DIASTOLIC BLOOD PRESSURE: 70 MMHG | WEIGHT: 180.2 LBS | HEIGHT: 71 IN | HEART RATE: 95 BPM | BODY MASS INDEX: 25.23 KG/M2 | OXYGEN SATURATION: 97 %

## 2022-04-06 DIAGNOSIS — Z02.0 SCHOOL PHYSICAL EXAM: Primary | ICD-10-CM

## 2022-04-06 PROBLEM — E03.9 ACQUIRED HYPOTHYROIDISM: Status: RESOLVED | Noted: 2018-12-20 | Resolved: 2022-04-06

## 2022-04-06 PROCEDURE — 99213 OFFICE O/P EST LOW 20 MIN: CPT | Performed by: FAMILY MEDICINE

## 2022-04-06 ASSESSMENT — ENCOUNTER SYMPTOMS
CHEST TIGHTNESS: 0
WHEEZING: 0
VOMITING: 0
COLOR CHANGE: 0
BLOOD IN STOOL: 0
DIARRHEA: 0
COUGH: 0
SHORTNESS OF BREATH: 0
ABDOMINAL PAIN: 0
EYE REDNESS: 0
CONSTIPATION: 0
NAUSEA: 0

## 2022-04-06 NOTE — PROGRESS NOTES
Arabella Kay  : 1995    Chief Complaint:     Chief Complaint   Patient presents with    School/Camp Physical     [employer] physical     HPI  Physical for [place]. History reviewed and no significant medical problems. Of note, HA and symptoms from last visit have resolved. Positive STAN 1:160 but follow up antibodies negative and he has NO signs/symptoms of autoimmune disease as reviewed. Hypothyroid per chart, most recent TSH T4 WNL without medication. No physical limitations or chronic joint/back pains. No cough, hemoptysis etc symptoms/signs of communicable disease. See form in media. Past medical, surgical, family and social histories reviewed and updated today as appropriate    Health Maintenance Due   Topic Date Due    Hepatitis C screen  Never done    COVID-19 Vaccine (1) Never done    HIV screen  Never done    Depression Screen  2022    TSH testing  2022       Current Outpatient Medications   Medication Sig Dispense Refill    loratadine (CLARITIN) 10 MG tablet Take 10 mg by mouth daily as needed.  vitamin D (CHOLECALCIFEROL) 1000 UNIT TABS tablet Take 1,000 Units by mouth daily.  therapeutic multivitamin-minerals (THERAGRAN-M) tablet Take 1 tablet by mouth daily. No current facility-administered medications for this visit. Allergies   Allergen Reactions    Latex Hives and Itching    Cefzil [Cefprozil] Hives         REVIEW OF SYSTEMS  Review of Systems   Constitutional: Negative for activity change, appetite change, chills, diaphoresis, fatigue, fever and unexpected weight change. HENT: Negative. Eyes: Negative for redness and visual disturbance. Respiratory: Negative for cough, chest tightness, shortness of breath and wheezing. Cardiovascular: Negative for chest pain, palpitations and leg swelling. Gastrointestinal: Negative for abdominal pain, blood in stool, constipation, diarrhea, nausea and vomiting.    Endocrine: Negative for cold intolerance, heat intolerance, polydipsia, polyphagia and polyuria. Musculoskeletal: Negative. Skin: Negative for color change, pallor and rash. Neurological: Negative for dizziness, syncope, weakness, numbness and headaches. Psychiatric/Behavioral: Negative for confusion, decreased concentration, dysphoric mood, hallucinations, self-injury, sleep disturbance and suicidal ideas. The patient is not nervous/anxious. All other systems reviewed and are negative. PHYSICAL EXAM  /70   Pulse 95   Temp 98.2 °F (36.8 °C) (Temporal)   Ht 5' 11\" (1.803 m)   Wt 180 lb 3.2 oz (81.7 kg)   SpO2 97%   BMI 25.13 kg/m²   Physical Exam  Vitals reviewed. Constitutional:       General: He is not in acute distress. Appearance: Normal appearance. He is well-developed and normal weight. He is not ill-appearing or diaphoretic. HENT:      Head: Normocephalic and atraumatic. Right Ear: Tympanic membrane, ear canal and external ear normal.      Left Ear: Tympanic membrane, ear canal and external ear normal.      Nose: Nose normal. No congestion or rhinorrhea. Mouth/Throat:      Mouth: Mucous membranes are moist.      Pharynx: Oropharynx is clear. No oropharyngeal exudate or posterior oropharyngeal erythema. Eyes:      General: No scleral icterus. Extraocular Movements: Extraocular movements intact. Conjunctiva/sclera: Conjunctivae normal.      Pupils: Pupils are equal, round, and reactive to light. Neck:      Thyroid: No thyromegaly. Vascular: No JVD. Cardiovascular:      Rate and Rhythm: Normal rate and regular rhythm. Heart sounds: Normal heart sounds. No murmur heard. No friction rub. No gallop. Pulmonary:      Effort: Pulmonary effort is normal. No respiratory distress. Breath sounds: Normal breath sounds. No stridor. No wheezing, rhonchi or rales. Abdominal:      General: Bowel sounds are normal. There is no distension.       Palpations: Abdomen is soft. There is no mass. Tenderness: There is no abdominal tenderness. There is no guarding or rebound. Musculoskeletal:         General: No swelling or tenderness. Normal range of motion. Cervical back: Neck supple. Right lower leg: No edema. Left lower leg: No edema. Lymphadenopathy:      Cervical: No cervical adenopathy. Skin:     General: Skin is warm and dry. Coloration: Skin is not pale. Findings: No erythema or rash. Neurological:      General: No focal deficit present. Mental Status: He is alert and oriented to person, place, and time. Psychiatric:         Mood and Affect: Mood normal.         Behavior: Behavior normal.         Thought Content: Thought content normal.         Judgment: Judgment normal.                             ASSESSMENT/PLAN:    1. School physical exam  No restrictions    See form in media      Reviewed age and gender appropriate health screening exams and vaccinations. Advised patient regarding importance of keeping up with recommended health maintenance and to schedule as soon as possible if overdue, as this is important in assessing for undiagnosed pathology, especially cancer. Patient verbalizes understanding and agrees. Call or go to ED immediately if symptoms worsen or persist.  No follow-ups on file. Sooner if necessary. Counseled regarding above diagnosis, including possible risks and complications,especially if left uncontrolled. Counseled regarding the possible side effects, risks, benefits and alternatives to treatment; patient and/or guardian verbalizes understanding. Advised patient to call with any newmedication issues. All questions answered.     Brigette Craig MD  4/6/22

## 2022-08-23 ENCOUNTER — APPOINTMENT (OUTPATIENT)
Dept: GENERAL RADIOLOGY | Age: 27
End: 2022-08-23
Payer: COMMERCIAL

## 2022-08-23 ENCOUNTER — HOSPITAL ENCOUNTER (EMERGENCY)
Age: 27
Discharge: HOME | End: 2022-08-23
Payer: COMMERCIAL

## 2022-08-23 VITALS
HEART RATE: 73 BPM | TEMPERATURE: 98.8 F | SYSTOLIC BLOOD PRESSURE: 123 MMHG | RESPIRATION RATE: 14 BRPM | DIASTOLIC BLOOD PRESSURE: 75 MMHG | OXYGEN SATURATION: 98 %

## 2022-08-23 DIAGNOSIS — S83.91XA SPRAIN OF RIGHT KNEE, UNSPECIFIED LIGAMENT, INITIAL ENCOUNTER: Primary | ICD-10-CM

## 2022-08-23 PROCEDURE — 6360000002 HC RX W HCPCS: Performed by: NURSE PRACTITIONER

## 2022-08-23 PROCEDURE — 99284 EMERGENCY DEPT VISIT MOD MDM: CPT

## 2022-08-23 PROCEDURE — 96372 THER/PROPH/DIAG INJ SC/IM: CPT

## 2022-08-23 PROCEDURE — 6370000000 HC RX 637 (ALT 250 FOR IP): Performed by: NURSE PRACTITIONER

## 2022-08-23 PROCEDURE — 73562 X-RAY EXAM OF KNEE 3: CPT

## 2022-08-23 PROCEDURE — 73552 X-RAY EXAM OF FEMUR 2/>: CPT

## 2022-08-23 RX ORDER — ACETAMINOPHEN 500 MG
500 TABLET ORAL 4 TIMES DAILY PRN
Qty: 20 TABLET | Refills: 1 | Status: SHIPPED | OUTPATIENT
Start: 2022-08-23

## 2022-08-23 RX ORDER — ACETAMINOPHEN 500 MG
1000 TABLET ORAL ONCE
Status: COMPLETED | OUTPATIENT
Start: 2022-08-23 | End: 2022-08-23

## 2022-08-23 RX ORDER — KETOROLAC TROMETHAMINE 30 MG/ML
30 INJECTION, SOLUTION INTRAMUSCULAR; INTRAVENOUS ONCE
Status: COMPLETED | OUTPATIENT
Start: 2022-08-23 | End: 2022-08-23

## 2022-08-23 RX ORDER — NAPROXEN 500 MG/1
500 TABLET ORAL 2 TIMES DAILY PRN
Qty: 14 TABLET | Refills: 0 | Status: SHIPPED | OUTPATIENT
Start: 2022-08-23 | End: 2022-08-30

## 2022-08-23 RX ADMIN — ACETAMINOPHEN 1000 MG: 500 TABLET ORAL at 18:16

## 2022-08-23 RX ADMIN — KETOROLAC TROMETHAMINE 30 MG: 30 INJECTION, SOLUTION INTRAMUSCULAR at 18:16

## 2022-08-23 NOTE — Clinical Note
Stacy Diaz was seen and treated in our emergency department on 8/23/2022. He should be cleared by a physician before returning to gym class or sports on 08/24/2022. If you have any questions or concerns, please don't hesitate to call.       Sarah Reid, DANDRE - CNP

## 2022-08-23 NOTE — ED PROVIDER NOTES
[address]  Department of Emergency Medicine   ED  Encounter Note  Admit Date/RoomTime: 2022  6:02 PM  ED Room: [facility]    NAME: Manuel Grewal  : 1995  MRN: [id number]     Chief Complaint:  Knee Pain (Right knee pain x 2 weeks. No known injury. +tenderness. -redness or swelling. Denies fever/chills. )    History of Present Illness       Manuel Grewal is a 32 y.o. old male who presents to the emergency department by private vehicle alone, for non-traumatic pain located lateral aspect of right knee and lateral distal thigh which occured 2 week(s) prior to arrival.  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 He denies any falls. Since onset the symptoms have been persistent and gradually worsening. Patient has no prior history of pain/injury with regards to today's visit. His pain is aggraveated by pressure on or palpation of painful area and relieved by nothing and despite taking liquid advil gel capsules at 9 AM today. His weight bearing ability is: abnormal and ambulates with a limp. He denies any fever, chills, wounds, or rash. Tetanus Status: up to date. ROS   Pertinent positives and negatives are stated within HPI, all other systems reviewed and are negative. Past Medical History:  has a past medical history of Acquired hypothyroidism, Asthma, Environmental allergies, Hypercholesteremia, and Latex allergy. Surgical History:  has a past surgical history that includes Appendectomy and Tympanostomy tube placement. Social History:  reports that he has never smoked. He has never used smokeless tobacco. He reports that he does not drink alcohol and does not use drugs. Family History: family history includes Mult Sclerosis in his mother; Pituitary Disorder in his mother.      Allergies: Latex and Cefzil [cefprozil]    Physical Exam   Oxygen Saturation Interpretation: Normal.        ED Triage Vitals   BP Temp Temp Source Heart Rate Resp SpO2 Height Weight   08/23/22 1809 08/23/22 1743 08/23/22 1743 08/23/22 1743 08/23/22 1848 08/23/22 1743 -- --   123/75 98.8 °F (37.1 °C) Oral 79 14 98 %           Constitutional:  Alert, development consistent with age. Neck:  Normal ROM. Supple. Physical Exam  Right Knee: lateral            Tenderness:  moderate. .              Swelling/Effusion: None. Deformity: no deformity observed/palpated. ROM: full range with pain. Skin:  no wounds, erythema, or swelling. Drawer's:  Negative. Lachman's: Negative. Apley's: Not Tested. Iris's: Negative. Valgus/Varus Stress: Negative. Crepitus: Negative. Hip:            Tenderness:  none. Swelling: None. Deformity: no.              ROM: full range of motion. Skin:  no wounds, erythema, or swelling. Joint(s) Above/Below: hip, ankle, and foot. Tenderness:  none. Swelling: No.              Deformity: no deformity observed/palpated. ROM: full range of motion. Skin:  no wounds, erythema, or swelling. Neurovascular: Motor deficit: none. Right leg lift negative. Sensory deficit: none. Sensation intact to light touch in distal toes. Pulse deficit: none. 2+ pedal posterior tibial pulses intact. Capillary refill: normal.  SN 3 seconds in distal toes. Gait:  limp due to affected limb. Lymphatics: No lymphangitis or adenopathy noted. Neurological:  Oriented. Motor functions intact. Lab / Imaging Results   (All laboratory and radiology results have been personally reviewed by myself)  Labs:  No results found for this visit on 08/23/22. Imaging: All Radiology results interpreted by Radiologist unless otherwise noted.   XR FEMUR RIGHT (MIN 2 VIEWS)   Final Result   No acute osseous abnormality. XR KNEE RIGHT (3 VIEWS)   Final Result   No acute abnormality of the knee. ED Course / Medical Decision Making     Medications   acetaminophen (TYLENOL) tablet 1,000 mg (1,000 mg Oral Given 8/23/22 1816)   ketorolac (TORADOL) injection 30 mg (30 mg IntraMUSCular Given 8/23/22 1816)      Re evaluation: 1944 patient returned from xray. Discussed results of x-rays. Patient has no pain along the deep venous system and his pain is all primarily on the lateral aspect of the knee most likely a sprain strain we will place an immobilizer. Consult(s):   None    Procedure(s):   None. MDM:     Imaging was obtained based on moderate suspicion for fracture / bony abnormality as per history/physical findings. Patient was placed in immobilizer he did not feel he needed crutches at this time. Patient advised on signs and symptoms warranting immediate return to the ED for reevaluation he will be given a short course of NSAIDs and acetaminophen for symptomatic relief. 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 He has no neurologic or sensory deficits on examination. Pulses are intact and has soft compressible compartments. Plan is subsequently for symptom control, limited use and immobilization with outpatient follow-up with pcp as instructed in d/c instructions and with on-call orthopaedist as instructed in d/c instructions. Plan of Care/Counseling:  DANDRE Crum CNP reviewed today's visit with the patient in addition to providing specific details for the plan of care and counseling regarding the diagnosis and prognosis. Questions are answered at this time and are agreeable with the plan. Assessment      1. Sprain of right knee, unspecified ligament, initial encounter      Plan   Discharged home.   Patient condition is good    New Medications     Discharge Medication List as of 8/23/2022  7:54 PM        START taking these medications    Details   naproxen (NAPROSYN) 500 MG tablet Take 1 tablet by mouth 2 times daily as needed for Pain (take with food and full glass of water.), Disp-14 tablet, R-0Print      acetaminophen (TYLENOL) 500 MG tablet Take 1 tablet by mouth 4 times daily as needed for Pain, Disp-20 tablet, R-1Print           Electronically signed by DANDRE Fulton CNP   DD: 8/23/22  **This report was transcribed using voice recognition software. Every effort was made to ensure accuracy; however, inadvertent computerized transcription errors may be present.   END OF ED PROVIDER NOTE      DANDRE Fulton CNP  08/23/22 0921

## 2022-08-25 ENCOUNTER — TELEPHONE (OUTPATIENT)
Dept: ADMINISTRATIVE | Age: 27
End: 2022-08-25

## 2022-08-25 DIAGNOSIS — M25.561 ACUTE PAIN OF RIGHT KNEE: Primary | ICD-10-CM

## 2022-08-25 NOTE — PROGRESS NOTES
CLINICAL PHARMACY NOTE: MEDS TO BEDS    Total # of Prescriptions Filled: 2   The following medications were delivered to the patient:  Acetaminophen extra strength 500 mg  Naproxen 500mg  Picked up in the pharmacy    Additional Documentation:

## 2022-08-25 NOTE — TELEPHONE ENCOUNTER
Patient appropriate to follow up with Dr. Gurinder Barajas  Electronically signed by Vitaliy Paulino PA-C on 8/25/2022 at 3:16 PM

## 2022-08-25 NOTE — TELEPHONE ENCOUNTER
Please advise scheduling patient for 8/23 ED follow up dx Sprain of Right knee. Patient is attending [employer] at [employer] and may be hard to reach.

## 2022-08-29 ENCOUNTER — OFFICE VISIT (OUTPATIENT)
Dept: ORTHOPEDIC SURGERY | Age: 27
End: 2022-08-29
Payer: COMMERCIAL

## 2022-08-29 VITALS — WEIGHT: 180 LBS | HEIGHT: 71 IN | BODY MASS INDEX: 25.2 KG/M2

## 2022-08-29 DIAGNOSIS — S83.281A TEAR OF LATERAL MENISCUS OF RIGHT KNEE, CURRENT, UNSPECIFIED TEAR TYPE, INITIAL ENCOUNTER: ICD-10-CM

## 2022-08-29 DIAGNOSIS — M25.561 ACUTE PAIN OF RIGHT KNEE: Primary | ICD-10-CM

## 2022-08-29 PROCEDURE — G8419 CALC BMI OUT NRM PARAM NOF/U: HCPCS | Performed by: FAMILY MEDICINE

## 2022-08-29 PROCEDURE — G8427 DOCREV CUR MEDS BY ELIG CLIN: HCPCS | Performed by: FAMILY MEDICINE

## 2022-08-29 PROCEDURE — 1036F TOBACCO NON-USER: CPT | Performed by: FAMILY MEDICINE

## 2022-08-29 PROCEDURE — 99204 OFFICE O/P NEW MOD 45 MIN: CPT | Performed by: FAMILY MEDICINE

## 2022-08-29 NOTE — PROGRESS NOTES
[facility]  PRIMARY CARE SPORTS MEDICINE  DATE OF VISIT : 2022    Patient : Veto Schirmer  Age : 32 y.o.  : 1995  MRN : [id number]   ______________________________________________________________________    Chief Complaint :   Chief Complaint   Patient presents with    Knee Pain     (R) knee pain. Patient was running about 2 weeks ago when he started to experience some discomfort. Pain is located on lateral aspect of the knee. Patient has been using compression sleeve and brace as well as oral meds with little relief. HPI : Veto Schirmer is 32 y.o. male who presented to the clinic today for evaluation of right knee pain. Onset of the symptoms was a few weeks ago, associated with increased running distance. Current symptoms include giving out, pain and swelling. Patient endorses intermittent mechanical symptoms. Pain is aggravated by any weight bearing especially stairs and deep squats. Evaluation to date: XRs of the right knee which demonstrated no acute fractures or disloations. Treatment to date: avoidance of offending activity and OTC analgesics which are not very effective. Past Medical History :  Past Medical History:   Diagnosis Date    Acquired hypothyroidism 2018    Asthma     Environmental allergies     Hypercholesteremia 2018    Latex allergy      Past Surgical History:   Procedure Laterality Date    APPENDECTOMY      TYMPANOSTOMY TUBE PLACEMENT         Allergies :    Allergies   Allergen Reactions    Latex Hives and Itching    Cefzil [Cefprozil] Hives       Medication List :    Current Outpatient Medications   Medication Sig Dispense Refill    naproxen (NAPROSYN) 500 MG tablet Take 1 tablet by mouth 2 times daily as needed for Pain (take with food and full glass of water.) 14 tablet 0    acetaminophen (TYLENOL) 500 MG tablet Take 1 tablet by mouth 4 times daily as needed for Pain 20 tablet 1    loratadine (CLARITIN) 10 MG tablet Take 10 mg by mouth daily as needed. vitamin D (CHOLECALCIFEROL) 1000 UNIT TABS tablet Take 1,000 Units by mouth daily. therapeutic multivitamin-minerals (THERAGRAN-M) tablet Take 1 tablet by mouth daily. No current facility-administered medications for this visit.      ______________________________________________________________________    Physical Exam :    Vitals: Ht 5' 11\" (1.803 m)   Wt 180 lb (81.6 kg)   BMI 25.10 kg/m²   General Appearance: Nontoxic, awake, alert, and in no acute distress. Chest wall/Lung: Respirations regular and unlabored. No cyanosis. Heart: RRR, distal pulses intact. Neurologic: Alert&Oriented x3. Sensation grossly intact. No focal motor deficits detected. Musculokeletal: RIGHT Knee:  ROM: flexion to 140, extension to 0.  1+ effusion. No obvious bony abnormality or ecchymosis. TTP: ( - ) MJL, ( + ) LJL, ( - ) patellar tendon, ( - ) quadriceps tendon, ( - ) patellar facets  Special Tests: ( - ) Patellar apprehension, ( - ) patellar grind  Stable and without pain to varus and valgus stress at 0 and 30 degrees of knee flexion. ACL: ( - ) Lachman's, ( - ) anterior drawer  PCL: ( - ) posterior drawer, ( - ) sag sign  Meniscus: ( + ) Iris's  ______________________________________________________________________    Assessment & Plan :    1. Acute pain of right knee  2. Tear of lateral meniscus of right knee, current, unspecified tear type, initial encounter  Patient presents to the office today for evaluation of right knee pain. History, referring provider note, physical exam and imaging (as interpreted by me) are consistent with possible lateral meniscal injury. Treatment options discussed with patient in the office today including activity modification, oral anti-inflammatories, physical therapy, injection options, advanced imaging in the form of a MRI and referral to an orthopedic surgeon for discussion of surgical opinion.  Due to the severity of symptoms, chronicity of symptoms and physical exam findings, a MRI of the right knee will be obtained to further delineate pathology and aid in medical decision making/surgical planning. Patient will follow up after completion of MRI for review few in the office. Patient is agreeable with above plan all questions and concerns were addressed in the office today. - diclofenac (VOLTAREN) 50 MG EC tablet; Take 1 tablet by mouth 2 times daily (with meals)  Dispense: 60 tablet; Refill: 0    Return to Office: Return for review of MRI after completion.     Vaughn Lobo MD

## 2022-09-03 ENCOUNTER — HOSPITAL ENCOUNTER (OUTPATIENT)
Dept: MRI IMAGING | Age: 27
Discharge: HOME | End: 2022-09-05
Payer: COMMERCIAL

## 2022-09-03 DIAGNOSIS — M25.561 ACUTE PAIN OF RIGHT KNEE: ICD-10-CM

## 2022-09-03 DIAGNOSIS — S83.281A TEAR OF LATERAL MENISCUS OF RIGHT KNEE, CURRENT, UNSPECIFIED TEAR TYPE, INITIAL ENCOUNTER: ICD-10-CM

## 2022-09-03 PROCEDURE — 73721 MRI JNT OF LWR EXTRE W/O DYE: CPT

## 2023-01-24 LAB — VARICELLA-ZOSTER VIRUS AB, IGG: NORMAL

## 2023-01-26 LAB
MEASLES IMMUNE (IGG): NORMAL
MUMPS AB IGG: NORMAL
RUBELLA ANTIBODY IGG: NORMAL

## 2023-09-14 ENCOUNTER — HOSPITAL ENCOUNTER (EMERGENCY)
Age: 28
Discharge: HOME | End: 2023-09-14
Attending: EMERGENCY MEDICINE
Payer: COMMERCIAL

## 2023-09-14 ENCOUNTER — APPOINTMENT (OUTPATIENT)
Dept: GENERAL RADIOLOGY | Age: 28
End: 2023-09-14
Payer: COMMERCIAL

## 2023-09-14 VITALS
HEIGHT: 71 IN | DIASTOLIC BLOOD PRESSURE: 75 MMHG | WEIGHT: 190 LBS | OXYGEN SATURATION: 99 % | HEART RATE: 97 BPM | BODY MASS INDEX: 26.6 KG/M2 | SYSTOLIC BLOOD PRESSURE: 128 MMHG | RESPIRATION RATE: 18 BRPM | TEMPERATURE: 98.7 F

## 2023-09-14 DIAGNOSIS — S81.011A LACERATION OF RIGHT KNEE, INITIAL ENCOUNTER: Primary | ICD-10-CM

## 2023-09-14 PROCEDURE — 6360000002 HC RX W HCPCS: Performed by: NURSE PRACTITIONER

## 2023-09-14 PROCEDURE — 90714 TD VACC NO PRESV 7 YRS+ IM: CPT | Performed by: NURSE PRACTITIONER

## 2023-09-14 PROCEDURE — 12001 RPR S/N/AX/GEN/TRNK 2.5CM/<: CPT

## 2023-09-14 PROCEDURE — 99284 EMERGENCY DEPT VISIT MOD MDM: CPT

## 2023-09-14 PROCEDURE — 6370000000 HC RX 637 (ALT 250 FOR IP): Performed by: NURSE PRACTITIONER

## 2023-09-14 PROCEDURE — 73560 X-RAY EXAM OF KNEE 1 OR 2: CPT

## 2023-09-14 PROCEDURE — 90471 IMMUNIZATION ADMIN: CPT | Performed by: NURSE PRACTITIONER

## 2023-09-14 RX ORDER — BACITRACIN ZINC 500 [USP'U]/G
OINTMENT TOPICAL ONCE
Status: COMPLETED | OUTPATIENT
Start: 2023-09-14 | End: 2023-09-14

## 2023-09-14 RX ADMIN — CLOSTRIDIUM TETANI TOXOID ANTIGEN (FORMALDEHYDE INACTIVATED) AND CORYNEBACTERIUM DIPHTHERIAE TOXOID ANTIGEN (FORMALDEHYDE INACTIVATED) 0.5 ML: 5; 2 INJECTION, SUSPENSION INTRAMUSCULAR at 23:11

## 2023-09-14 RX ADMIN — BACITRACIN ZINC: 500 OINTMENT TOPICAL at 23:14

## 2023-09-14 ASSESSMENT — PAIN SCALES - GENERAL: PAINLEVEL_OUTOF10: 4

## 2023-09-14 ASSESSMENT — PAIN - FUNCTIONAL ASSESSMENT
PAIN_FUNCTIONAL_ASSESSMENT: ACTIVITIES ARE NOT PREVENTED
PAIN_FUNCTIONAL_ASSESSMENT: 0-10

## 2023-09-14 ASSESSMENT — PAIN DESCRIPTION - FREQUENCY: FREQUENCY: CONTINUOUS

## 2023-09-14 ASSESSMENT — PAIN DESCRIPTION - ONSET: ONSET: ON-GOING

## 2023-09-14 ASSESSMENT — PAIN DESCRIPTION - LOCATION: LOCATION: KNEE

## 2023-09-14 ASSESSMENT — PAIN DESCRIPTION - PAIN TYPE: TYPE: ACUTE PAIN

## 2023-09-14 ASSESSMENT — PAIN DESCRIPTION - DESCRIPTORS: DESCRIPTORS: ACHING;BURNING;DISCOMFORT

## 2023-09-14 ASSESSMENT — PAIN DESCRIPTION - ORIENTATION: ORIENTATION: RIGHT

## 2023-09-15 NOTE — DISCHARGE INSTRUCTIONS
Leave the dressing in place tonight, tomorrow you may remove it, wash the area gently with mild soap and water, apply bacitracin and a dressing. You will need to clean it at least once to twice daily. You may use the knee immobilizer over the next 2 to 3 days to prevent bending of the knee. Your sutures will need to be removed in 10 to 14 days.   Please follow-up with your PCP for suture removal.

## 2023-09-15 NOTE — ED NOTES
Knee immobilizer intact to right knee with DSD  Discharged with followup to PCP.  Return here if worse charanjit Calderon RN  09/14/23 [id number]

## 2025-05-18 ENCOUNTER — APPOINTMENT (OUTPATIENT)
Dept: CT IMAGING | Age: 30
DRG: 184 | End: 2025-05-18
Payer: COMMERCIAL

## 2025-05-18 ENCOUNTER — APPOINTMENT (OUTPATIENT)
Dept: GENERAL RADIOLOGY | Age: 30
DRG: 184 | End: 2025-05-18
Payer: COMMERCIAL

## 2025-05-18 ENCOUNTER — HOSPITAL ENCOUNTER (INPATIENT)
Age: 30
LOS: 7 days | Discharge: HOME | DRG: 184 | End: 2025-05-25
Attending: STUDENT IN AN ORGANIZED HEALTH CARE EDUCATION/TRAINING PROGRAM | Admitting: SURGERY
Payer: COMMERCIAL

## 2025-05-18 DIAGNOSIS — S22.089A CLOSED FRACTURE OF ELEVENTH THORACIC VERTEBRA, UNSPECIFIED FRACTURE MORPHOLOGY, INITIAL ENCOUNTER (HCC): ICD-10-CM

## 2025-05-18 DIAGNOSIS — S22.089A CLOSED FRACTURE OF TWELFTH THORACIC VERTEBRA, UNSPECIFIED FRACTURE MORPHOLOGY, INITIAL ENCOUNTER (HCC): ICD-10-CM
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
## 2025-05-18 DIAGNOSIS — T14.90XA TRAUMA: Primary | ICD-10-CM

## 2025-05-18 DIAGNOSIS — S22.42XA CLOSED FRACTURE OF MULTIPLE RIBS OF LEFT SIDE, INITIAL ENCOUNTER: ICD-10-CM

## 2025-05-18 LAB
ABO + RH BLD: NORMAL
ALBUMIN SERPL-MCNC: 4.8 G/DL (ref 3.5–5.2)
ALP SERPL-CCNC: 80 U/L (ref 40–129)
ALT SERPL-CCNC: 45 U/L (ref 0–50)
ANION GAP SERPL CALCULATED.3IONS-SCNC: 17 MMOL/L (ref 7–16)
APAP SERPL-MCNC: <5 UG/ML (ref 10–30)
ARM BAND NUMBER: NORMAL
AST SERPL-CCNC: 57 U/L (ref 0–50)
B.E.: -1.9 MMOL/L (ref -3–3)
BILIRUB SERPL-MCNC: 0.4 MG/DL (ref 0–1.2)
BLOOD BANK SAMPLE EXPIRATION: NORMAL
BLOOD GROUP ANTIBODIES SERPL: NEGATIVE
BUN SERPL-MCNC: 20 MG/DL (ref 6–20)
CALCIUM SERPL-MCNC: 9.6 MG/DL (ref 8.6–10)
CHLORIDE SERPL-SCNC: 101 MMOL/L (ref 98–107)
CO2 SERPL-SCNC: 22 MMOL/L (ref 22–29)
COHB: 0.9 % (ref 0–1.5)
CREAT SERPL-MCNC: 1.3 MG/DL (ref 0.7–1.2)
CRITICAL: ABNORMAL
DATE ANALYZED: ABNORMAL
DATE OF COLLECTION: ABNORMAL
ERYTHROCYTE [DISTWIDTH] IN BLOOD BY AUTOMATED COUNT: 11.8 % (ref 11.5–15)
ETHANOLAMINE SERPL-MCNC: <10 MG/DL (ref 0–0.08)
GFR, ESTIMATED: 78 ML/MIN/1.73M2
GLUCOSE SERPL-MCNC: 136 MG/DL (ref 74–99)
HCO3: 23 MMOL/L (ref 22–26)
HCT VFR BLD AUTO: 48.3 % (ref 37–54)
HGB BLD-MCNC: 16.2 G/DL (ref 12.5–16.5)
HHB: 1.5 % (ref 0–5)
INR PPP: 1
LAB: ABNORMAL
LACTATE BLDV-SCNC: 3.1 MMOL/L (ref 0.5–2.2)
Lab: 2200
MCH RBC QN AUTO: 31.3 PG (ref 26–35)
MCHC RBC AUTO-ENTMCNC: 33.5 G/DL (ref 32–34.5)
MCV RBC AUTO: 93.2 FL (ref 80–99.9)
METHB: 0.6 % (ref 0–1.5)
MODE: ABNORMAL
O2 SATURATION: 98.5 % (ref 92–98.5)
O2HB: 97 % (ref 94–97)
OPERATOR ID: 1023
PARTIAL THROMBOPLASTIN TIME: 34 SEC (ref 24.5–35.1)
PATIENT TEMP: 37 C
PCO2: 39.8 MMHG (ref 35–45)
PH BLOOD GAS: 7.38 (ref 7.35–7.45)
PLATELET # BLD AUTO: 369 K/UL (ref 130–450)
PMV BLD AUTO: 9.6 FL (ref 7–12)
PO2: 131.7 MMHG (ref 75–100)
POTASSIUM SERPL-SCNC: 3.69 MMOL/L (ref 3.5–5)
POTASSIUM SERPL-SCNC: 3.7 MMOL/L (ref 3.5–5.1)
PROT SERPL-MCNC: 8.1 G/DL (ref 6.4–8.3)
PROTHROMBIN TIME: 10.5 SEC (ref 9.3–12.4)
RBC # BLD AUTO: 5.18 M/UL (ref 3.8–5.8)
SALICYLATES SERPL-MCNC: <0.5 MG/DL (ref 0–30)
SODIUM SERPL-SCNC: 140 MMOL/L (ref 136–145)
SOURCE, BLOOD GAS: ABNORMAL
THB: 16.7 G/DL (ref 11.5–16.5)
TIME ANALYZED: 2204
TOXIC TRICYCLIC SC,BLOOD: NEGATIVE
WBC OTHER # BLD: 14.4 K/UL (ref 4.5–11.5)

## 2025-05-18 PROCEDURE — 71045 X-RAY EXAM CHEST 1 VIEW: CPT

## 2025-05-18 PROCEDURE — 80307 DRUG TEST PRSMV CHEM ANLYZR: CPT

## 2025-05-18 PROCEDURE — 6360000002 HC RX W HCPCS: Performed by: SURGERY

## 2025-05-18 PROCEDURE — 72131 CT LUMBAR SPINE W/O DYE: CPT

## 2025-05-18 PROCEDURE — 85576 BLOOD PLATELET AGGREGATION: CPT

## 2025-05-18 PROCEDURE — 85390 FIBRINOLYSINS SCREEN I&R: CPT

## 2025-05-18 PROCEDURE — 6360000002 HC RX W HCPCS

## 2025-05-18 PROCEDURE — 74177 CT ABD & PELVIS W/CONTRAST: CPT

## 2025-05-18 PROCEDURE — 6370000000 HC RX 637 (ALT 250 FOR IP)

## 2025-05-18 PROCEDURE — 72125 CT NECK SPINE W/O DYE: CPT

## 2025-05-18 PROCEDURE — 83605 ASSAY OF LACTIC ACID: CPT

## 2025-05-18 PROCEDURE — 6360000002 HC RX W HCPCS: Performed by: STUDENT IN AN ORGANIZED HEALTH CARE EDUCATION/TRAINING PROGRAM

## 2025-05-18 PROCEDURE — 70450 CT HEAD/BRAIN W/O DYE: CPT

## 2025-05-18 PROCEDURE — 85347 COAGULATION TIME ACTIVATED: CPT

## 2025-05-18 PROCEDURE — 85027 COMPLETE CBC AUTOMATED: CPT

## 2025-05-18 PROCEDURE — 1200000000 HC SEMI PRIVATE

## 2025-05-18 PROCEDURE — 82805 BLOOD GASES W/O2 SATURATION: CPT

## 2025-05-18 PROCEDURE — 80053 COMPREHEN METABOLIC PANEL: CPT

## 2025-05-18 PROCEDURE — 85610 PROTHROMBIN TIME: CPT

## 2025-05-18 PROCEDURE — 96374 THER/PROPH/DIAG INJ IV PUSH: CPT

## 2025-05-18 PROCEDURE — 85384 FIBRINOGEN ACTIVITY: CPT

## 2025-05-18 PROCEDURE — 36600 WITHDRAWAL OF ARTERIAL BLOOD: CPT | Performed by: SURGERY

## 2025-05-18 PROCEDURE — 99223 1ST HOSP IP/OBS HIGH 75: CPT | Performed by: SURGERY

## 2025-05-18 PROCEDURE — 80143 DRUG ASSAY ACETAMINOPHEN: CPT

## 2025-05-18 PROCEDURE — 6810039000 HC L1 TRAUMA ALERT

## 2025-05-18 PROCEDURE — 99285 EMERGENCY DEPT VISIT HI MDM: CPT

## 2025-05-18 PROCEDURE — 86900 BLOOD TYPING SEROLOGIC ABO: CPT

## 2025-05-18 PROCEDURE — 84132 ASSAY OF SERUM POTASSIUM: CPT

## 2025-05-18 PROCEDURE — 80179 DRUG ASSAY SALICYLATE: CPT

## 2025-05-18 PROCEDURE — 72128 CT CHEST SPINE W/O DYE: CPT

## 2025-05-18 PROCEDURE — 99222 1ST HOSP IP/OBS MODERATE 55: CPT | Performed by: NEUROLOGICAL SURGERY

## 2025-05-18 PROCEDURE — 86901 BLOOD TYPING SEROLOGIC RH(D): CPT

## 2025-05-18 PROCEDURE — 85730 THROMBOPLASTIN TIME PARTIAL: CPT

## 2025-05-18 PROCEDURE — 72170 X-RAY EXAM OF PELVIS: CPT

## 2025-05-18 PROCEDURE — G0480 DRUG TEST DEF 1-7 CLASSES: HCPCS

## 2025-05-18 PROCEDURE — 71260 CT THORAX DX C+: CPT

## 2025-05-18 PROCEDURE — 86850 RBC ANTIBODY SCREEN: CPT

## 2025-05-18 PROCEDURE — 6360000004 HC RX CONTRAST MEDICATION: Performed by: RADIOLOGY

## 2025-05-18 RX ORDER — SODIUM CHLORIDE 9 MG/ML
INJECTION, SOLUTION INTRAVENOUS PRN
Status: DISCONTINUED | OUTPATIENT
Start: 2025-05-18 | End: 2025-05-25 | Stop reason: HOSPADM

## 2025-05-18 RX ORDER — SODIUM CHLORIDE 0.9 % (FLUSH) 0.9 %
10 SYRINGE (ML) INJECTION EVERY 12 HOURS SCHEDULED
Status: DISCONTINUED | OUTPATIENT
Start: 2025-05-18 | End: 2025-05-25 | Stop reason: HOSPADM

## 2025-05-18 RX ORDER — FENTANYL CITRATE 50 UG/ML
50 INJECTION, SOLUTION INTRAMUSCULAR; INTRAVENOUS ONCE
Status: COMPLETED | OUTPATIENT
Start: 2025-05-18 | End: 2025-05-18

## 2025-05-18 RX ORDER — FENTANYL CITRATE 50 UG/ML
INJECTION, SOLUTION INTRAMUSCULAR; INTRAVENOUS
Status: COMPLETED
Start: 2025-05-18 | End: 2025-05-18

## 2025-05-18 RX ORDER — OXYCODONE HYDROCHLORIDE 10 MG/1
10 TABLET ORAL EVERY 4 HOURS PRN
Status: DISCONTINUED | OUTPATIENT
Start: 2025-05-18 | End: 2025-05-22

## 2025-05-18 RX ORDER — MORPHINE SULFATE 4 MG/ML
6 INJECTION, SOLUTION INTRAMUSCULAR; INTRAVENOUS ONCE
Status: COMPLETED | OUTPATIENT
Start: 2025-05-18 | End: 2025-05-18

## 2025-05-18 RX ORDER — IOPAMIDOL 755 MG/ML
75 INJECTION, SOLUTION INTRAVASCULAR
Status: COMPLETED | OUTPATIENT
Start: 2025-05-18 | End: 2025-05-18

## 2025-05-18 RX ORDER — POLYETHYLENE GLYCOL 3350 17 G/17G
17 POWDER, FOR SOLUTION ORAL DAILY
Status: DISCONTINUED | OUTPATIENT
Start: 2025-05-19 | End: 2025-05-25 | Stop reason: HOSPADM

## 2025-05-18 RX ORDER — METHOCARBAMOL 500 MG/1
1000 TABLET, FILM COATED ORAL 4 TIMES DAILY
Status: DISCONTINUED | OUTPATIENT
Start: 2025-05-18 | End: 2025-05-25 | Stop reason: HOSPADM

## 2025-05-18 RX ORDER — SENNA AND DOCUSATE SODIUM 50; 8.6 MG/1; MG/1
1 TABLET, FILM COATED ORAL 2 TIMES DAILY
Status: DISCONTINUED | OUTPATIENT
Start: 2025-05-18 | End: 2025-05-25 | Stop reason: HOSPADM

## 2025-05-18 RX ORDER — HYDROMORPHONE HYDROCHLORIDE 1 MG/ML
1 INJECTION, SOLUTION INTRAMUSCULAR; INTRAVENOUS; SUBCUTANEOUS
Status: DISCONTINUED | OUTPATIENT
Start: 2025-05-18 | End: 2025-05-20 | Stop reason: ALTCHOICE

## 2025-05-18 RX ORDER — ACETAMINOPHEN 325 MG/1
650 TABLET ORAL EVERY 6 HOURS
Status: DISCONTINUED | OUTPATIENT
Start: 2025-05-18 | End: 2025-05-23

## 2025-05-18 RX ORDER — OXYCODONE HYDROCHLORIDE 5 MG/1
5 TABLET ORAL EVERY 4 HOURS PRN
Status: DISCONTINUED | OUTPATIENT
Start: 2025-05-18 | End: 2025-05-22

## 2025-05-18 RX ORDER — ONDANSETRON 2 MG/ML
4 INJECTION INTRAMUSCULAR; INTRAVENOUS EVERY 6 HOURS PRN
Status: DISCONTINUED | OUTPATIENT
Start: 2025-05-18 | End: 2025-05-25 | Stop reason: HOSPADM

## 2025-05-18 RX ORDER — SODIUM CHLORIDE 0.9 % (FLUSH) 0.9 %
10 SYRINGE (ML) INJECTION PRN
Status: DISCONTINUED | OUTPATIENT
Start: 2025-05-18 | End: 2025-05-25 | Stop reason: HOSPADM

## 2025-05-18 RX ORDER — ONDANSETRON 4 MG/1
4 TABLET, ORALLY DISINTEGRATING ORAL EVERY 8 HOURS PRN
Status: DISCONTINUED | OUTPATIENT
Start: 2025-05-18 | End: 2025-05-25 | Stop reason: HOSPADM

## 2025-05-18 RX ORDER — SODIUM CHLORIDE, SODIUM LACTATE, POTASSIUM CHLORIDE, CALCIUM CHLORIDE 600; 310; 30; 20 MG/100ML; MG/100ML; MG/100ML; MG/100ML
INJECTION, SOLUTION INTRAVENOUS CONTINUOUS
Status: DISCONTINUED | OUTPATIENT
Start: 2025-05-18 | End: 2025-05-20

## 2025-05-18 RX ADMIN — MORPHINE SULFATE 6 MG: 4 INJECTION INTRAVENOUS at 23:10

## 2025-05-18 RX ADMIN — FENTANYL CITRATE 50 MCG: 50 INJECTION INTRAMUSCULAR; INTRAVENOUS at 22:27

## 2025-05-18 RX ADMIN — FENTANYL CITRATE 50 MCG: 50 INJECTION, SOLUTION INTRAMUSCULAR; INTRAVENOUS at 22:10

## 2025-05-18 RX ADMIN — SENNOSIDES, DOCUSATE SODIUM 1 TABLET: 50; 8.6 TABLET, FILM COATED ORAL at 23:58

## 2025-05-18 RX ADMIN — METHOCARBAMOL 1000 MG: 500 TABLET ORAL at 23:57

## 2025-05-18 RX ADMIN — HYDROMORPHONE HYDROCHLORIDE 1 MG: 1 INJECTION, SOLUTION INTRAMUSCULAR; INTRAVENOUS; SUBCUTANEOUS at 23:57

## 2025-05-18 RX ADMIN — IOPAMIDOL 75 ML: 755 INJECTION, SOLUTION INTRAVENOUS at 22:31

## 2025-05-18 RX ADMIN — ACETAMINOPHEN 650 MG: 325 TABLET ORAL at 23:58

## 2025-05-18 RX ADMIN — FENTANYL CITRATE 50 MCG: 50 INJECTION INTRAMUSCULAR; INTRAVENOUS at 22:10

## 2025-05-18 ASSESSMENT — PAIN DESCRIPTION - LOCATION
LOCATION: GENERALIZED
LOCATION: BACK

## 2025-05-18 ASSESSMENT — PAIN DESCRIPTION - DESCRIPTORS
DESCRIPTORS: ACHING
DESCRIPTORS: CRUSHING

## 2025-05-18 ASSESSMENT — PAIN SCALES - GENERAL
PAINLEVEL_OUTOF10: 10
PAINLEVEL_OUTOF10: 10

## 2025-05-19 ENCOUNTER — APPOINTMENT (OUTPATIENT)
Dept: GENERAL RADIOLOGY | Age: 30
DRG: 184 | End: 2025-05-19
Payer: COMMERCIAL

## 2025-05-19 PROBLEM — S22.42XA CLOSED FRACTURE OF MULTIPLE RIBS OF LEFT SIDE: Status: ACTIVE | Noted: 2025-05-19

## 2025-05-19 PROBLEM — S22.001A CLOSED BURST FRACTURE OF THORACIC VERTEBRA (HCC): Status: ACTIVE | Noted: 2025-05-19

## 2025-05-19 LAB
AMPHET UR QL SCN: NEGATIVE
ANION GAP SERPL CALCULATED.3IONS-SCNC: 12 MMOL/L (ref 7–16)
BARBITURATES UR QL SCN: NEGATIVE
BASOPHILS # BLD: 0.03 K/UL (ref 0–0.2)
BASOPHILS NFR BLD: 0 % (ref 0–2)
BENZODIAZ UR QL: NEGATIVE
BUN SERPL-MCNC: 21 MG/DL (ref 6–20)
BUPRENORPHINE UR QL: NEGATIVE
CALCIUM SERPL-MCNC: 9.1 MG/DL (ref 8.6–10)
CANNABINOIDS UR QL SCN: NEGATIVE
CHLORIDE SERPL-SCNC: 103 MMOL/L (ref 98–107)
CLOT ANGLE.KAOLIN INDUCED BLD RES TEG: 69.6 DEG (ref 53–70)
CO2 SERPL-SCNC: 23 MMOL/L (ref 22–29)
COCAINE UR QL SCN: NEGATIVE
CREAT SERPL-MCNC: 0.9 MG/DL (ref 0.7–1.2)
EOSINOPHIL # BLD: 0.01 K/UL (ref 0.05–0.5)
EOSINOPHILS RELATIVE PERCENT: 0 % (ref 0–6)
EPL-TEG: 0.1 % (ref 0–15)
ERYTHROCYTE [DISTWIDTH] IN BLOOD BY AUTOMATED COUNT: 11.9 % (ref 11.5–15)
FENTANYL UR QL: POSITIVE
G-TEG: 13.6 KDYN/CM2 (ref 4.5–11)
GFR, ESTIMATED: >90 ML/MIN/1.73M2
GLUCOSE SERPL-MCNC: 157 MG/DL (ref 74–99)
HCT VFR BLD AUTO: 44.8 % (ref 37–54)
HGB BLD-MCNC: 14.8 G/DL (ref 12.5–16.5)
IMM GRANULOCYTES # BLD AUTO: 0.1 K/UL (ref 0–0.58)
IMM GRANULOCYTES NFR BLD: 1 % (ref 0–5)
KINETICS TEG: 1.5 MIN (ref 1–3)
LY30 (LYSIS) TEG: 0.1 % (ref 0–8)
LYMPHOCYTES NFR BLD: 0.61 K/UL (ref 1.5–4)
LYMPHOCYTES RELATIVE PERCENT: 4 % (ref 20–42)
MA (MAX CLOT) TEG: 73.2 MM (ref 50–70)
MCH RBC QN AUTO: 31.2 PG (ref 26–35)
MCHC RBC AUTO-ENTMCNC: 33 G/DL (ref 32–34.5)
MCV RBC AUTO: 94.3 FL (ref 80–99.9)
METHADONE UR QL: NEGATIVE
MONOCYTES NFR BLD: 1.19 K/UL (ref 0.1–0.95)
MONOCYTES NFR BLD: 8 % (ref 2–12)
NEUTROPHILS NFR BLD: 86 % (ref 43–80)
NEUTS SEG NFR BLD: 12.15 K/UL (ref 1.8–7.3)
OPIATES UR QL SCN: POSITIVE
OXYCODONE UR QL SCN: NEGATIVE
PCP UR QL SCN: NEGATIVE
PLATELET # BLD AUTO: 262 K/UL (ref 130–450)
PMV BLD AUTO: 9.7 FL (ref 7–12)
POTASSIUM SERPL-SCNC: 4.7 MMOL/L (ref 3.5–5.1)
RBC # BLD AUTO: 4.75 M/UL (ref 3.8–5.8)
REACTION TIME TEG: 5.2 MIN (ref 5–10)
SODIUM SERPL-SCNC: 138 MMOL/L (ref 136–145)
TEST INFORMATION: ABNORMAL
WBC OTHER # BLD: 14.1 K/UL (ref 4.5–11.5)

## 2025-05-19 PROCEDURE — 6370000000 HC RX 637 (ALT 250 FOR IP)

## 2025-05-19 PROCEDURE — 85025 COMPLETE CBC W/AUTO DIFF WBC: CPT

## 2025-05-19 PROCEDURE — 80048 BASIC METABOLIC PNL TOTAL CA: CPT

## 2025-05-19 PROCEDURE — 73030 X-RAY EXAM OF SHOULDER: CPT

## 2025-05-19 PROCEDURE — 2580000003 HC RX 258

## 2025-05-19 PROCEDURE — 2500000003 HC RX 250 WO HCPCS

## 2025-05-19 PROCEDURE — 1200000000 HC SEMI PRIVATE

## 2025-05-19 PROCEDURE — 36415 COLL VENOUS BLD VENIPUNCTURE: CPT

## 2025-05-19 PROCEDURE — 6360000002 HC RX W HCPCS

## 2025-05-19 PROCEDURE — 94640 AIRWAY INHALATION TREATMENT: CPT

## 2025-05-19 PROCEDURE — 2700000000 HC OXYGEN THERAPY PER DAY

## 2025-05-19 PROCEDURE — 80307 DRUG TEST PRSMV CHEM ANLYZR: CPT

## 2025-05-19 RX ORDER — ENOXAPARIN SODIUM 100 MG/ML
30 INJECTION SUBCUTANEOUS 2 TIMES DAILY
Status: DISCONTINUED | OUTPATIENT
Start: 2025-05-19 | End: 2025-05-25 | Stop reason: HOSPADM

## 2025-05-19 RX ORDER — IPRATROPIUM BROMIDE AND ALBUTEROL SULFATE 2.5; .5 MG/3ML; MG/3ML
1 SOLUTION RESPIRATORY (INHALATION)
Status: DISCONTINUED | OUTPATIENT
Start: 2025-05-19 | End: 2025-05-20

## 2025-05-19 RX ORDER — SCOPOLAMINE 1 MG/3D
1 PATCH, EXTENDED RELEASE TRANSDERMAL
Status: DISCONTINUED | OUTPATIENT
Start: 2025-05-19 | End: 2025-05-25 | Stop reason: HOSPADM

## 2025-05-19 RX ADMIN — HYDROMORPHONE HYDROCHLORIDE 1 MG: 1 INJECTION, SOLUTION INTRAMUSCULAR; INTRAVENOUS; SUBCUTANEOUS at 19:17

## 2025-05-19 RX ADMIN — SODIUM CHLORIDE, SODIUM LACTATE, POTASSIUM CHLORIDE, AND CALCIUM CHLORIDE: .6; .31; .03; .02 INJECTION, SOLUTION INTRAVENOUS at 00:03

## 2025-05-19 RX ADMIN — ONDANSETRON 4 MG: 2 INJECTION, SOLUTION INTRAMUSCULAR; INTRAVENOUS at 03:28

## 2025-05-19 RX ADMIN — SODIUM CHLORIDE, SODIUM LACTATE, POTASSIUM CHLORIDE, AND CALCIUM CHLORIDE: .6; .31; .03; .02 INJECTION, SOLUTION INTRAVENOUS at 10:40

## 2025-05-19 RX ADMIN — HYDROMORPHONE HYDROCHLORIDE 0.5 MG: 1 INJECTION, SOLUTION INTRAMUSCULAR; INTRAVENOUS; SUBCUTANEOUS at 07:35

## 2025-05-19 RX ADMIN — IPRATROPIUM BROMIDE AND ALBUTEROL SULFATE 1 DOSE: 2.5; .5 SOLUTION RESPIRATORY (INHALATION) at 11:20

## 2025-05-19 RX ADMIN — HYDROMORPHONE HYDROCHLORIDE 1 MG: 1 INJECTION, SOLUTION INTRAMUSCULAR; INTRAVENOUS; SUBCUTANEOUS at 03:31

## 2025-05-19 RX ADMIN — SODIUM CHLORIDE, SODIUM LACTATE, POTASSIUM CHLORIDE, AND CALCIUM CHLORIDE: .6; .31; .03; .02 INJECTION, SOLUTION INTRAVENOUS at 19:18

## 2025-05-19 RX ADMIN — ONDANSETRON 4 MG: 4 TABLET, ORALLY DISINTEGRATING ORAL at 10:41

## 2025-05-19 RX ADMIN — HYDROMORPHONE HYDROCHLORIDE 1 MG: 1 INJECTION, SOLUTION INTRAMUSCULAR; INTRAVENOUS; SUBCUTANEOUS at 10:59

## 2025-05-19 RX ADMIN — SODIUM CHLORIDE, PRESERVATIVE FREE 10 ML: 5 INJECTION INTRAVENOUS at 03:30

## 2025-05-19 RX ADMIN — IPRATROPIUM BROMIDE AND ALBUTEROL SULFATE 1 DOSE: 2.5; .5 SOLUTION RESPIRATORY (INHALATION) at 20:36

## 2025-05-19 RX ADMIN — ENOXAPARIN SODIUM 30 MG: 100 INJECTION SUBCUTANEOUS at 11:23

## 2025-05-19 RX ADMIN — METHOCARBAMOL 1000 MG: 500 TABLET ORAL at 13:29

## 2025-05-19 RX ADMIN — HYDROMORPHONE HYDROCHLORIDE 1 MG: 1 INJECTION, SOLUTION INTRAMUSCULAR; INTRAVENOUS; SUBCUTANEOUS at 14:59

## 2025-05-19 RX ADMIN — ONDANSETRON 4 MG: 2 INJECTION, SOLUTION INTRAMUSCULAR; INTRAVENOUS at 14:58

## 2025-05-19 RX ADMIN — IPRATROPIUM BROMIDE AND ALBUTEROL SULFATE 1 DOSE: 2.5; .5 SOLUTION RESPIRATORY (INHALATION) at 09:26

## 2025-05-19 ASSESSMENT — PAIN DESCRIPTION - ONSET
ONSET: ON-GOING

## 2025-05-19 ASSESSMENT — PAIN DESCRIPTION - DESCRIPTORS
DESCRIPTORS: SHARP;SHOOTING;TEARING
DESCRIPTORS: DISCOMFORT;SHARP;THROBBING
DESCRIPTORS: STABBING;SHARP;DISCOMFORT
DESCRIPTORS: ACHING;DISCOMFORT;TENDER;SHARP

## 2025-05-19 ASSESSMENT — PAIN - FUNCTIONAL ASSESSMENT
PAIN_FUNCTIONAL_ASSESSMENT: PREVENTS OR INTERFERES SOME ACTIVE ACTIVITIES AND ADLS

## 2025-05-19 ASSESSMENT — PAIN DESCRIPTION - PAIN TYPE
TYPE: ACUTE PAIN

## 2025-05-19 ASSESSMENT — PAIN DESCRIPTION - FREQUENCY
FREQUENCY: INTERMITTENT

## 2025-05-19 ASSESSMENT — PAIN SCALES - GENERAL
PAINLEVEL_OUTOF10: 7
PAINLEVEL_OUTOF10: 8
PAINLEVEL_OUTOF10: 8
PAINLEVEL_OUTOF10: 10
PAINLEVEL_OUTOF10: 8
PAINLEVEL_OUTOF10: 7
PAINLEVEL_OUTOF10: 10
PAINLEVEL_OUTOF10: 7
PAINLEVEL_OUTOF10: 6
PAINLEVEL_OUTOF10: 10
PAINLEVEL_OUTOF10: 7

## 2025-05-19 ASSESSMENT — PAIN DESCRIPTION - ORIENTATION
ORIENTATION: RIGHT;POSTERIOR
ORIENTATION: POSTERIOR;LEFT
ORIENTATION: LEFT;POSTERIOR
ORIENTATION: RIGHT;POSTERIOR;LEFT
ORIENTATION: MID

## 2025-05-19 ASSESSMENT — PAIN DESCRIPTION - LOCATION
LOCATION: BACK;RIB CAGE
LOCATION: BACK;ARM;RIB CAGE
LOCATION: BACK
LOCATION: BACK
LOCATION: BACK;RIB CAGE
LOCATION: BACK;SHOULDER;RIB CAGE
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
## 2025-05-19 NOTE — DISCHARGE INSTRUCTIONS
[facility] DISCHARGE INSTRUCTIONS    Call [phone], option 2, for any questions/concerns and for follow-up appointment in 1 week(s).    Please follow the instructions checked below:      ACTIVITY INSTRUCTIONS  Increase activity as tolerated  No heavy lifting or strenuous activity  Take your incentive spirometer home and use 4-6 times/day   [x]  No driving until cleared by all providers    WOUND/DRESSING INSTRUCTIONS:  You may shower.  No sitting in bath tub, hot tub or swimming until cleared by physician.  Ice to areas of pain for first 24 hours.  Heat to areas of pain after that.  Wash areas of lacerations/abrasions with soap & water.  Rinse well.  Pat dry with clean towel.  Apply thin layer of Bacitracin, Neosporin, or triple antibiotic cream to affected area 2-3 times per day.  Keep wounds clean and dry.   []  Sutures/Staples are to be removed in *** {DAY/WK/MON/YRS:20513}.    MEDICATION INSTRUCTIONS  Take medication as prescribed.  When taking pain medications, you may experience dizziness or drowsiness.  Do not drink alcohol or drive when taking these medications.  You may experience constipation while taking pain medication.  You may take over the counter stool softeners such as docusate (Colace), sennosides S (Senokot-S), or Miralax.   [x]  You may take Ibuprofen (over the counter) as directed for mild pain.     --You may take up to 800mg every 8 hours for pain, please take with food or milk.   [x]  You may take acetaminophen (Tylenol) products.  Do NOT take more than 4000mg of Tylenol in 24h.   [x]  Do not take any other acetaminophen (Tylenol) products if you are taking Percocet or Norco, as these contain Tylenol.   --Do NOT take more than 4000mg of Tylenol in 24h.    OPIOID MEDICATION INSTRUCTIONS  Read the medication guide that is included with your prescription.  Take your medication exactly as prescribed.  Store medication away from children and in a safe place.  Do NOT share your medication

## 2025-05-19 NOTE — PLAN OF CARE
Problem: Discharge Planning  Goal: Discharge to home or other facility with appropriate resources  Outcome: Progressing     Problem: Pain  Goal: Verbalizes/displays adequate comfort level or baseline comfort level  Outcome: Progressing     Problem: Skin/Tissue Integrity  Goal: Skin integrity remains intact  Description: 1.  Monitor for areas of redness and/or skin breakdown2.  Assess vascular access sites hourly3.  Every 4-6 hours minimum:  Change oxygen saturation probe site4.  Every 4-6 hours:  If on nasal continuous positive airway pressure, respiratory therapy assess nares and determine need for appliance change or resting period  Outcome: Progressing     Problem: Safety - Adult  Goal: Free from fall injury  Outcome: Progressing

## 2025-05-19 NOTE — ED NOTES
Patient log rolled with cspine maintained, back board removed at this time. Tenderness to entire back. No obvious deformities noted

## 2025-05-20 ENCOUNTER — APPOINTMENT (OUTPATIENT)
Dept: GENERAL RADIOLOGY | Age: 30
DRG: 184 | End: 2025-05-20
Payer: COMMERCIAL

## 2025-05-20 PROBLEM — S22.089A CLOSED T11 SPINAL FRACTURE (HCC): Status: ACTIVE | Noted: 2025-05-19
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
## 2025-05-20 LAB
ANION GAP SERPL CALCULATED.3IONS-SCNC: 10 MMOL/L (ref 7–16)
BASOPHILS # BLD: 0.03 K/UL (ref 0–0.2)
BASOPHILS NFR BLD: 0 % (ref 0–2)
BUN SERPL-MCNC: 14 MG/DL (ref 6–20)
CALCIUM SERPL-MCNC: 8.6 MG/DL (ref 8.6–10)
CHLORIDE SERPL-SCNC: 99 MMOL/L (ref 98–107)
CO2 SERPL-SCNC: 24 MMOL/L (ref 22–29)
CREAT SERPL-MCNC: 0.8 MG/DL (ref 0.7–1.2)
EOSINOPHIL # BLD: 0.02 K/UL (ref 0.05–0.5)
EOSINOPHILS RELATIVE PERCENT: 0 % (ref 0–6)
ERYTHROCYTE [DISTWIDTH] IN BLOOD BY AUTOMATED COUNT: 12.3 % (ref 11.5–15)
GFR, ESTIMATED: >90 ML/MIN/1.73M2
GLUCOSE SERPL-MCNC: 144 MG/DL (ref 74–99)
HCT VFR BLD AUTO: 40.1 % (ref 37–54)
HGB BLD-MCNC: 13.1 G/DL (ref 12.5–16.5)
IMM GRANULOCYTES # BLD AUTO: 0.06 K/UL (ref 0–0.58)
IMM GRANULOCYTES NFR BLD: 1 % (ref 0–5)
LYMPHOCYTES NFR BLD: 0.72 K/UL (ref 1.5–4)
LYMPHOCYTES RELATIVE PERCENT: 5 % (ref 20–42)
MCH RBC QN AUTO: 31.5 PG (ref 26–35)
MCHC RBC AUTO-ENTMCNC: 32.7 G/DL (ref 32–34.5)
MCV RBC AUTO: 96.4 FL (ref 80–99.9)
MONOCYTES NFR BLD: 1.22 K/UL (ref 0.1–0.95)
MONOCYTES NFR BLD: 9 % (ref 2–12)
NEUTROPHILS NFR BLD: 85 % (ref 43–80)
NEUTS SEG NFR BLD: 11.17 K/UL (ref 1.8–7.3)
PLATELET # BLD AUTO: 223 K/UL (ref 130–450)
PMV BLD AUTO: 9.8 FL (ref 7–12)
POTASSIUM SERPL-SCNC: 4.2 MMOL/L (ref 3.5–5.1)
RBC # BLD AUTO: 4.16 M/UL (ref 3.8–5.8)
SODIUM SERPL-SCNC: 134 MMOL/L (ref 136–145)
WBC OTHER # BLD: 13.2 K/UL (ref 4.5–11.5)

## 2025-05-20 PROCEDURE — 97161 PT EVAL LOW COMPLEX 20 MIN: CPT

## 2025-05-20 PROCEDURE — 0DH67UZ INSERTION OF FEEDING DEVICE INTO STOMACH, VIA NATURAL OR ARTIFICIAL OPENING: ICD-10-PCS | Performed by: STUDENT IN AN ORGANIZED HEALTH CARE EDUCATION/TRAINING PROGRAM

## 2025-05-20 PROCEDURE — 74018 RADEX ABDOMEN 1 VIEW: CPT

## 2025-05-20 PROCEDURE — 99232 SBSQ HOSP IP/OBS MODERATE 35: CPT | Performed by: SURGERY

## 2025-05-20 PROCEDURE — 36415 COLL VENOUS BLD VENIPUNCTURE: CPT

## 2025-05-20 PROCEDURE — 6360000002 HC RX W HCPCS: Performed by: STUDENT IN AN ORGANIZED HEALTH CARE EDUCATION/TRAINING PROGRAM

## 2025-05-20 PROCEDURE — 6360000002 HC RX W HCPCS

## 2025-05-20 PROCEDURE — 51798 US URINE CAPACITY MEASURE: CPT

## 2025-05-20 PROCEDURE — 6370000000 HC RX 637 (ALT 250 FOR IP)

## 2025-05-20 PROCEDURE — 6370000000 HC RX 637 (ALT 250 FOR IP): Performed by: STUDENT IN AN ORGANIZED HEALTH CARE EDUCATION/TRAINING PROGRAM

## 2025-05-20 PROCEDURE — 51701 INSERT BLADDER CATHETER: CPT

## 2025-05-20 PROCEDURE — 2500000003 HC RX 250 WO HCPCS

## 2025-05-20 PROCEDURE — 1200000000 HC SEMI PRIVATE

## 2025-05-20 PROCEDURE — 80048 BASIC METABOLIC PNL TOTAL CA: CPT

## 2025-05-20 PROCEDURE — 99232 SBSQ HOSP IP/OBS MODERATE 35: CPT | Performed by: NEUROLOGICAL SURGERY

## 2025-05-20 PROCEDURE — 97165 OT EVAL LOW COMPLEX 30 MIN: CPT

## 2025-05-20 PROCEDURE — 85025 COMPLETE CBC W/AUTO DIFF WBC: CPT

## 2025-05-20 PROCEDURE — 97535 SELF CARE MNGMENT TRAINING: CPT

## 2025-05-20 PROCEDURE — 2580000003 HC RX 258

## 2025-05-20 PROCEDURE — L0486 TLSO RIGIDLINED CUST FAB TWO: HCPCS

## 2025-05-20 RX ORDER — LIDOCAINE HYDROCHLORIDE 20 MG/ML
JELLY TOPICAL ONCE
Status: COMPLETED | OUTPATIENT
Start: 2025-05-20 | End: 2025-05-20

## 2025-05-20 RX ORDER — SODIUM CHLORIDE 9 MG/ML
INJECTION, SOLUTION INTRAVENOUS CONTINUOUS
Status: DISCONTINUED | OUTPATIENT
Start: 2025-05-20 | End: 2025-05-23

## 2025-05-20 RX ORDER — LIDOCAINE HYDROCHLORIDE 20 MG/ML
JELLY TOPICAL PRN
Status: DISCONTINUED | OUTPATIENT
Start: 2025-05-20 | End: 2025-05-25 | Stop reason: HOSPADM

## 2025-05-20 RX ORDER — KETOROLAC TROMETHAMINE 30 MG/ML
30 INJECTION, SOLUTION INTRAMUSCULAR; INTRAVENOUS EVERY 6 HOURS
Status: DISCONTINUED | OUTPATIENT
Start: 2025-05-20 | End: 2025-05-22

## 2025-05-20 RX ORDER — OXYMETAZOLINE HYDROCHLORIDE 0.05 G/100ML
2 SPRAY NASAL ONCE
Status: COMPLETED | OUTPATIENT
Start: 2025-05-20 | End: 2025-05-20

## 2025-05-20 RX ORDER — IPRATROPIUM BROMIDE AND ALBUTEROL SULFATE 2.5; .5 MG/3ML; MG/3ML
1 SOLUTION RESPIRATORY (INHALATION) EVERY 4 HOURS PRN
Status: DISCONTINUED | OUTPATIENT
Start: 2025-05-20 | End: 2025-05-25 | Stop reason: HOSPADM

## 2025-05-20 RX ADMIN — ENOXAPARIN SODIUM 30 MG: 100 INJECTION SUBCUTANEOUS at 22:00

## 2025-05-20 RX ADMIN — HYDROMORPHONE HYDROCHLORIDE 1 MG: 1 INJECTION, SOLUTION INTRAMUSCULAR; INTRAVENOUS; SUBCUTANEOUS at 01:02

## 2025-05-20 RX ADMIN — OXYCODONE HYDROCHLORIDE 10 MG: 10 TABLET ORAL at 10:23

## 2025-05-20 RX ADMIN — KETOROLAC TROMETHAMINE 30 MG: 30 INJECTION, SOLUTION INTRAMUSCULAR at 17:08

## 2025-05-20 RX ADMIN — ONDANSETRON 4 MG: 2 INJECTION, SOLUTION INTRAMUSCULAR; INTRAVENOUS at 04:42

## 2025-05-20 RX ADMIN — ENOXAPARIN SODIUM 30 MG: 100 INJECTION SUBCUTANEOUS at 10:22

## 2025-05-20 RX ADMIN — SODIUM CHLORIDE, PRESERVATIVE FREE 10 ML: 5 INJECTION INTRAVENOUS at 22:01

## 2025-05-20 RX ADMIN — KETOROLAC TROMETHAMINE 30 MG: 30 INJECTION, SOLUTION INTRAMUSCULAR at 11:00

## 2025-05-20 RX ADMIN — SODIUM CHLORIDE: 0.9 INJECTION, SOLUTION INTRAVENOUS at 19:34

## 2025-05-20 RX ADMIN — SODIUM CHLORIDE, PRESERVATIVE FREE 10 ML: 5 INJECTION INTRAVENOUS at 10:25

## 2025-05-20 RX ADMIN — ENOXAPARIN SODIUM 30 MG: 100 INJECTION SUBCUTANEOUS at 01:02

## 2025-05-20 RX ADMIN — METHOCARBAMOL 1000 MG: 500 TABLET ORAL at 10:27

## 2025-05-20 RX ADMIN — HYDROMORPHONE HYDROCHLORIDE 1 MG: 1 INJECTION, SOLUTION INTRAMUSCULAR; INTRAVENOUS; SUBCUTANEOUS at 05:25

## 2025-05-20 RX ADMIN — KETOROLAC TROMETHAMINE 30 MG: 30 INJECTION, SOLUTION INTRAMUSCULAR at 23:28

## 2025-05-20 RX ADMIN — Medication 2 SPRAY: at 17:50

## 2025-05-20 RX ADMIN — HYDROMORPHONE HYDROCHLORIDE 0.5 MG: 1 INJECTION, SOLUTION INTRAMUSCULAR; INTRAVENOUS; SUBCUTANEOUS at 11:42

## 2025-05-20 RX ADMIN — LIDOCAINE HYDROCHLORIDE: 20 JELLY TOPICAL at 17:50

## 2025-05-20 RX ADMIN — ONDANSETRON 4 MG: 2 INJECTION, SOLUTION INTRAMUSCULAR; INTRAVENOUS at 22:00

## 2025-05-20 RX ADMIN — ONDANSETRON 4 MG: 4 TABLET, ORALLY DISINTEGRATING ORAL at 10:23

## 2025-05-20 RX ADMIN — HYDROMORPHONE HYDROCHLORIDE 0.5 MG: 1 INJECTION, SOLUTION INTRAMUSCULAR; INTRAVENOUS; SUBCUTANEOUS at 14:43

## 2025-05-20 RX ADMIN — ACETAMINOPHEN 650 MG: 325 TABLET ORAL at 10:23

## 2025-05-20 RX ADMIN — SENNOSIDES, DOCUSATE SODIUM 1 TABLET: 50; 8.6 TABLET, FILM COATED ORAL at 10:24

## 2025-05-20 RX ADMIN — HYDROMORPHONE HYDROCHLORIDE 0.5 MG: 1 INJECTION, SOLUTION INTRAMUSCULAR; INTRAVENOUS; SUBCUTANEOUS at 22:00

## 2025-05-20 RX ADMIN — LIDOCAINE HYDROCHLORIDE: 20 JELLY TOPICAL at 16:10

## 2025-05-20 RX ADMIN — METHOCARBAMOL 1000 MG: 500 TABLET ORAL at 04:43

## 2025-05-20 ASSESSMENT — PAIN SCALES - GENERAL
PAINLEVEL_OUTOF10: 9
PAINLEVEL_OUTOF10: 10
PAINLEVEL_OUTOF10: 8
PAINLEVEL_OUTOF10: 3
PAINLEVEL_OUTOF10: 6
PAINLEVEL_OUTOF10: 8
PAINLEVEL_OUTOF10: 3
PAINLEVEL_OUTOF10: 10
PAINLEVEL_OUTOF10: 7

## 2025-05-20 ASSESSMENT — PAIN DESCRIPTION - LOCATION
LOCATION: BACK
LOCATION: BACK;RIB CAGE
LOCATION: SHOULDER
LOCATION: BACK
LOCATION: BACK;RIB CAGE
LOCATION: BACK
LOCATION: BACK;RIB CAGE
LOCATION: BACK;RIB CAGE

## 2025-05-20 ASSESSMENT — PAIN DESCRIPTION - ONSET
ONSET: ON-GOING

## 2025-05-20 ASSESSMENT — PAIN DESCRIPTION - DESCRIPTORS
DESCRIPTORS: ACHING;DISCOMFORT
DESCRIPTORS: ACHING;STABBING;THROBBING
DESCRIPTORS: ACHING;DISCOMFORT;DULL
DESCRIPTORS: ACHING;DISCOMFORT;THROBBING
DESCRIPTORS: ACHING;DISCOMFORT;SPASM
DESCRIPTORS: ACHING;DISCOMFORT;SORE
DESCRIPTORS: ACHING;DISCOMFORT;DULL
DESCRIPTORS: ACHING;DISCOMFORT;SORE

## 2025-05-20 ASSESSMENT — PAIN DESCRIPTION - FREQUENCY
FREQUENCY: CONTINUOUS

## 2025-05-20 ASSESSMENT — PAIN DESCRIPTION - ORIENTATION
ORIENTATION: LOWER
ORIENTATION: RIGHT;POSTERIOR
ORIENTATION: LOWER
ORIENTATION: LEFT
ORIENTATION: RIGHT;POSTERIOR

## 2025-05-20 ASSESSMENT — PAIN - FUNCTIONAL ASSESSMENT
PAIN_FUNCTIONAL_ASSESSMENT: PREVENTS OR INTERFERES SOME ACTIVE ACTIVITIES AND ADLS

## 2025-05-20 ASSESSMENT — PAIN DESCRIPTION - PAIN TYPE
TYPE: ACUTE PAIN
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
## 2025-05-21 ENCOUNTER — APPOINTMENT (OUTPATIENT)
Dept: GENERAL RADIOLOGY | Age: 30
DRG: 184 | End: 2025-05-21
Payer: COMMERCIAL

## 2025-05-21 LAB
ANION GAP SERPL CALCULATED.3IONS-SCNC: 10 MMOL/L (ref 7–16)
BASOPHILS # BLD: 0.02 K/UL (ref 0–0.2)
BASOPHILS NFR BLD: 0 % (ref 0–2)
BUN SERPL-MCNC: 13 MG/DL (ref 6–20)
CALCIUM SERPL-MCNC: 8.7 MG/DL (ref 8.6–10)
CHLORIDE SERPL-SCNC: 101 MMOL/L (ref 98–107)
CO2 SERPL-SCNC: 26 MMOL/L (ref 22–29)
CREAT SERPL-MCNC: 0.8 MG/DL (ref 0.7–1.2)
EOSINOPHIL # BLD: 0.06 K/UL (ref 0.05–0.5)
EOSINOPHILS RELATIVE PERCENT: 1 % (ref 0–6)
ERYTHROCYTE [DISTWIDTH] IN BLOOD BY AUTOMATED COUNT: 12.1 % (ref 11.5–15)
GFR, ESTIMATED: >90 ML/MIN/1.73M2
GLUCOSE SERPL-MCNC: 120 MG/DL (ref 74–99)
HCT VFR BLD AUTO: 36.9 % (ref 37–54)
HGB BLD-MCNC: 12.1 G/DL (ref 12.5–16.5)
IMM GRANULOCYTES # BLD AUTO: 0.06 K/UL (ref 0–0.58)
IMM GRANULOCYTES NFR BLD: 1 % (ref 0–5)
LYMPHOCYTES NFR BLD: 0.94 K/UL (ref 1.5–4)
LYMPHOCYTES RELATIVE PERCENT: 9 % (ref 20–42)
MCH RBC QN AUTO: 31.2 PG (ref 26–35)
MCHC RBC AUTO-ENTMCNC: 32.8 G/DL (ref 32–34.5)
MCV RBC AUTO: 95.1 FL (ref 80–99.9)
MONOCYTES NFR BLD: 1.26 K/UL (ref 0.1–0.95)
MONOCYTES NFR BLD: 12 % (ref 2–12)
NEUTROPHILS NFR BLD: 78 % (ref 43–80)
NEUTS SEG NFR BLD: 8.52 K/UL (ref 1.8–7.3)
PLATELET # BLD AUTO: 192 K/UL (ref 130–450)
PMV BLD AUTO: 9.7 FL (ref 7–12)
POTASSIUM SERPL-SCNC: 4.5 MMOL/L (ref 3.5–5.1)
RBC # BLD AUTO: 3.88 M/UL (ref 3.8–5.8)
SODIUM SERPL-SCNC: 136 MMOL/L (ref 136–145)
WBC OTHER # BLD: 10.9 K/UL (ref 4.5–11.5)

## 2025-05-21 PROCEDURE — 80048 BASIC METABOLIC PNL TOTAL CA: CPT

## 2025-05-21 PROCEDURE — 97530 THERAPEUTIC ACTIVITIES: CPT

## 2025-05-21 PROCEDURE — 6360000002 HC RX W HCPCS

## 2025-05-21 PROCEDURE — 2700000000 HC OXYGEN THERAPY PER DAY

## 2025-05-21 PROCEDURE — 2580000003 HC RX 258

## 2025-05-21 PROCEDURE — 51798 US URINE CAPACITY MEASURE: CPT

## 2025-05-21 PROCEDURE — 6360000002 HC RX W HCPCS: Performed by: STUDENT IN AN ORGANIZED HEALTH CARE EDUCATION/TRAINING PROGRAM

## 2025-05-21 PROCEDURE — 99232 SBSQ HOSP IP/OBS MODERATE 35: CPT | Performed by: SURGERY

## 2025-05-21 PROCEDURE — 97535 SELF CARE MNGMENT TRAINING: CPT

## 2025-05-21 PROCEDURE — 2500000003 HC RX 250 WO HCPCS

## 2025-05-21 PROCEDURE — 1200000000 HC SEMI PRIVATE

## 2025-05-21 PROCEDURE — 36415 COLL VENOUS BLD VENIPUNCTURE: CPT

## 2025-05-21 PROCEDURE — 85025 COMPLETE CBC W/AUTO DIFF WBC: CPT

## 2025-05-21 RX ORDER — TAMSULOSIN HYDROCHLORIDE 0.4 MG/1
0.4 CAPSULE ORAL DAILY
Status: DISCONTINUED | OUTPATIENT
Start: 2025-05-21 | End: 2025-05-25 | Stop reason: HOSPADM

## 2025-05-21 RX ADMIN — KETOROLAC TROMETHAMINE 30 MG: 30 INJECTION, SOLUTION INTRAMUSCULAR at 11:33

## 2025-05-21 RX ADMIN — SODIUM CHLORIDE: 0.9 INJECTION, SOLUTION INTRAVENOUS at 02:24

## 2025-05-21 RX ADMIN — HYDROMORPHONE HYDROCHLORIDE 0.5 MG: 1 INJECTION, SOLUTION INTRAMUSCULAR; INTRAVENOUS; SUBCUTANEOUS at 18:26

## 2025-05-21 RX ADMIN — ENOXAPARIN SODIUM 30 MG: 100 INJECTION SUBCUTANEOUS at 21:39

## 2025-05-21 RX ADMIN — KETOROLAC TROMETHAMINE 30 MG: 30 INJECTION, SOLUTION INTRAMUSCULAR at 23:21

## 2025-05-21 RX ADMIN — HYDROMORPHONE HYDROCHLORIDE 0.5 MG: 1 INJECTION, SOLUTION INTRAMUSCULAR; INTRAVENOUS; SUBCUTANEOUS at 09:57

## 2025-05-21 RX ADMIN — KETOROLAC TROMETHAMINE 30 MG: 30 INJECTION, SOLUTION INTRAMUSCULAR at 05:04

## 2025-05-21 RX ADMIN — SODIUM CHLORIDE: 0.9 INJECTION, SOLUTION INTRAVENOUS at 23:22

## 2025-05-21 RX ADMIN — SODIUM CHLORIDE, PRESERVATIVE FREE 10 ML: 5 INJECTION INTRAVENOUS at 09:57

## 2025-05-21 RX ADMIN — ENOXAPARIN SODIUM 30 MG: 100 INJECTION SUBCUTANEOUS at 09:57

## 2025-05-21 RX ADMIN — HYDROMORPHONE HYDROCHLORIDE 0.5 MG: 1 INJECTION, SOLUTION INTRAMUSCULAR; INTRAVENOUS; SUBCUTANEOUS at 05:07

## 2025-05-21 RX ADMIN — HYDROMORPHONE HYDROCHLORIDE 0.5 MG: 1 INJECTION, SOLUTION INTRAMUSCULAR; INTRAVENOUS; SUBCUTANEOUS at 21:39

## 2025-05-21 RX ADMIN — KETOROLAC TROMETHAMINE 30 MG: 30 INJECTION, SOLUTION INTRAMUSCULAR at 17:07

## 2025-05-21 RX ADMIN — HYDROMORPHONE HYDROCHLORIDE 0.5 MG: 1 INJECTION, SOLUTION INTRAMUSCULAR; INTRAVENOUS; SUBCUTANEOUS at 13:28

## 2025-05-21 ASSESSMENT — PAIN - FUNCTIONAL ASSESSMENT
PAIN_FUNCTIONAL_ASSESSMENT: PREVENTS OR INTERFERES SOME ACTIVE ACTIVITIES AND ADLS
PAIN_FUNCTIONAL_ASSESSMENT: PREVENTS OR INTERFERES SOME ACTIVE ACTIVITIES AND ADLS

## 2025-05-21 ASSESSMENT — PAIN DESCRIPTION - ORIENTATION
ORIENTATION: POSTERIOR;UPPER
ORIENTATION: LEFT
ORIENTATION: MID;LOWER
ORIENTATION: LOWER
ORIENTATION: LOWER

## 2025-05-21 ASSESSMENT — PAIN DESCRIPTION - PAIN TYPE: TYPE: ACUTE PAIN

## 2025-05-21 ASSESSMENT — PAIN DESCRIPTION - LOCATION
LOCATION: BACK;HEAD
LOCATION: BACK
LOCATION: RIB CAGE;BACK;HEAD
LOCATION: BACK
LOCATION: BACK

## 2025-05-21 ASSESSMENT — PAIN SCALES - GENERAL
PAINLEVEL_OUTOF10: 10
PAINLEVEL_OUTOF10: 10
PAINLEVEL_OUTOF10: 8
PAINLEVEL_OUTOF10: 10
PAINLEVEL_OUTOF10: 8
PAINLEVEL_OUTOF10: 8
PAINLEVEL_OUTOF10: 6
PAINLEVEL_OUTOF10: 9

## 2025-05-21 ASSESSMENT — PAIN DESCRIPTION - DESCRIPTORS
DESCRIPTORS: ACHING;THROBBING;SHARP
DESCRIPTORS: ACHING;DISCOMFORT;THROBBING
DESCRIPTORS: ACHING;DISCOMFORT;SORE
DESCRIPTORS: ACHING;DISCOMFORT;SORE
DESCRIPTORS: DISCOMFORT;ACHING;THROBBING

## 2025-05-21 ASSESSMENT — PAIN DESCRIPTION - ONSET: ONSET: ON-GOING

## 2025-05-21 ASSESSMENT — PAIN DESCRIPTION - FREQUENCY: FREQUENCY: CONTINUOUS

## 2025-05-22 LAB
ANION GAP SERPL CALCULATED.3IONS-SCNC: 10 MMOL/L (ref 7–16)
BASOPHILS # BLD: 0.03 K/UL (ref 0–0.2)
BASOPHILS NFR BLD: 0 % (ref 0–2)
BUN SERPL-MCNC: 16 MG/DL (ref 6–20)
CALCIUM SERPL-MCNC: 8.5 MG/DL (ref 8.6–10)
CHLORIDE SERPL-SCNC: 103 MMOL/L (ref 98–107)
CO2 SERPL-SCNC: 25 MMOL/L (ref 22–29)
CREAT SERPL-MCNC: 0.9 MG/DL (ref 0.7–1.2)
EOSINOPHIL # BLD: 0.23 K/UL (ref 0.05–0.5)
EOSINOPHILS RELATIVE PERCENT: 3 % (ref 0–6)
ERYTHROCYTE [DISTWIDTH] IN BLOOD BY AUTOMATED COUNT: 11.9 % (ref 11.5–15)
GFR, ESTIMATED: >90 ML/MIN/1.73M2
GLUCOSE SERPL-MCNC: 114 MG/DL (ref 74–99)
HCT VFR BLD AUTO: 36.7 % (ref 37–54)
HGB BLD-MCNC: 12.2 G/DL (ref 12.5–16.5)
IMM GRANULOCYTES # BLD AUTO: 0.03 K/UL (ref 0–0.58)
IMM GRANULOCYTES NFR BLD: 0 % (ref 0–5)
LYMPHOCYTES NFR BLD: 1.04 K/UL (ref 1.5–4)
LYMPHOCYTES RELATIVE PERCENT: 12 % (ref 20–42)
MCH RBC QN AUTO: 31.3 PG (ref 26–35)
MCHC RBC AUTO-ENTMCNC: 33.2 G/DL (ref 32–34.5)
MCV RBC AUTO: 94.1 FL (ref 80–99.9)
MONOCYTES NFR BLD: 1.07 K/UL (ref 0.1–0.95)
MONOCYTES NFR BLD: 13 % (ref 2–12)
NEUTROPHILS NFR BLD: 72 % (ref 43–80)
NEUTS SEG NFR BLD: 6.11 K/UL (ref 1.8–7.3)
PLATELET # BLD AUTO: 203 K/UL (ref 130–450)
PMV BLD AUTO: 9.5 FL (ref 7–12)
POTASSIUM SERPL-SCNC: 4.3 MMOL/L (ref 3.5–5.1)
RBC # BLD AUTO: 3.9 M/UL (ref 3.8–5.8)
SODIUM SERPL-SCNC: 138 MMOL/L (ref 136–145)
WBC OTHER # BLD: 8.5 K/UL (ref 4.5–11.5)

## 2025-05-22 PROCEDURE — 6370000000 HC RX 637 (ALT 250 FOR IP)

## 2025-05-22 PROCEDURE — 6360000002 HC RX W HCPCS: Performed by: STUDENT IN AN ORGANIZED HEALTH CARE EDUCATION/TRAINING PROGRAM

## 2025-05-22 PROCEDURE — 97530 THERAPEUTIC ACTIVITIES: CPT

## 2025-05-22 PROCEDURE — 80048 BASIC METABOLIC PNL TOTAL CA: CPT

## 2025-05-22 PROCEDURE — 85025 COMPLETE CBC W/AUTO DIFF WBC: CPT

## 2025-05-22 PROCEDURE — 6370000000 HC RX 637 (ALT 250 FOR IP): Performed by: NURSE PRACTITIONER

## 2025-05-22 PROCEDURE — 1200000000 HC SEMI PRIVATE

## 2025-05-22 PROCEDURE — 97535 SELF CARE MNGMENT TRAINING: CPT

## 2025-05-22 PROCEDURE — 36415 COLL VENOUS BLD VENIPUNCTURE: CPT

## 2025-05-22 PROCEDURE — 99232 SBSQ HOSP IP/OBS MODERATE 35: CPT | Performed by: SURGERY

## 2025-05-22 PROCEDURE — 6360000002 HC RX W HCPCS

## 2025-05-22 PROCEDURE — 2500000003 HC RX 250 WO HCPCS

## 2025-05-22 PROCEDURE — 2580000003 HC RX 258

## 2025-05-22 PROCEDURE — 6370000000 HC RX 637 (ALT 250 FOR IP): Performed by: STUDENT IN AN ORGANIZED HEALTH CARE EDUCATION/TRAINING PROGRAM

## 2025-05-22 RX ORDER — MECOBALAMIN 5000 MCG
10 TABLET,DISINTEGRATING ORAL NIGHTLY
Status: DISCONTINUED | OUTPATIENT
Start: 2025-05-22 | End: 2025-05-25 | Stop reason: HOSPADM

## 2025-05-22 RX ORDER — IBUPROFEN 400 MG/1
400 TABLET, FILM COATED ORAL EVERY 6 HOURS
Status: DISCONTINUED | OUTPATIENT
Start: 2025-05-22 | End: 2025-05-25 | Stop reason: HOSPADM

## 2025-05-22 RX ORDER — HYDROCODONE BITARTRATE AND ACETAMINOPHEN 5; 325 MG/1; MG/1
1 TABLET ORAL EVERY 4 HOURS PRN
Status: DISCONTINUED | OUTPATIENT
Start: 2025-05-22 | End: 2025-05-25 | Stop reason: HOSPADM

## 2025-05-22 RX ORDER — HYDROCODONE BITARTRATE AND ACETAMINOPHEN 5; 325 MG/1; MG/1
2 TABLET ORAL EVERY 4 HOURS PRN
Status: DISCONTINUED | OUTPATIENT
Start: 2025-05-22 | End: 2025-05-25 | Stop reason: HOSPADM

## 2025-05-22 RX ADMIN — HYDROMORPHONE HYDROCHLORIDE 0.5 MG: 1 INJECTION, SOLUTION INTRAMUSCULAR; INTRAVENOUS; SUBCUTANEOUS at 06:53

## 2025-05-22 RX ADMIN — SENNOSIDES, DOCUSATE SODIUM 1 TABLET: 50; 8.6 TABLET, FILM COATED ORAL at 20:28

## 2025-05-22 RX ADMIN — HYDROMORPHONE HYDROCHLORIDE 0.5 MG: 1 INJECTION, SOLUTION INTRAMUSCULAR; INTRAVENOUS; SUBCUTANEOUS at 03:56

## 2025-05-22 RX ADMIN — HYDROCODONE BITARTRATE AND ACETAMINOPHEN 1 TABLET: 5; 325 TABLET ORAL at 13:19

## 2025-05-22 RX ADMIN — HYDROCODONE BITARTRATE AND ACETAMINOPHEN 2 TABLET: 5; 325 TABLET ORAL at 18:04

## 2025-05-22 RX ADMIN — POLYETHYLENE GLYCOL 3350 17 G: 17 POWDER, FOR SOLUTION ORAL at 08:40

## 2025-05-22 RX ADMIN — HYDROCODONE BITARTRATE AND ACETAMINOPHEN 2 TABLET: 5; 325 TABLET ORAL at 22:08

## 2025-05-22 RX ADMIN — TAMSULOSIN HYDROCHLORIDE 0.4 MG: 0.4 CAPSULE ORAL at 08:40

## 2025-05-22 RX ADMIN — METHOCARBAMOL 1000 MG: 500 TABLET ORAL at 14:23

## 2025-05-22 RX ADMIN — METHOCARBAMOL 1000 MG: 500 TABLET ORAL at 18:03

## 2025-05-22 RX ADMIN — HYDROMORPHONE HYDROCHLORIDE 0.5 MG: 1 INJECTION, SOLUTION INTRAMUSCULAR; INTRAVENOUS; SUBCUTANEOUS at 10:13

## 2025-05-22 RX ADMIN — SODIUM CHLORIDE, PRESERVATIVE FREE 10 ML: 5 INJECTION INTRAVENOUS at 22:10

## 2025-05-22 RX ADMIN — ENOXAPARIN SODIUM 30 MG: 100 INJECTION SUBCUTANEOUS at 20:28

## 2025-05-22 RX ADMIN — KETOROLAC TROMETHAMINE 30 MG: 30 INJECTION, SOLUTION INTRAMUSCULAR at 06:01

## 2025-05-22 RX ADMIN — ENOXAPARIN SODIUM 30 MG: 100 INJECTION SUBCUTANEOUS at 08:40

## 2025-05-22 RX ADMIN — ACETAMINOPHEN 650 MG: 325 TABLET ORAL at 22:08

## 2025-05-22 RX ADMIN — Medication 10 MG: at 23:10

## 2025-05-22 RX ADMIN — IBUPROFEN 400 MG: 400 TABLET, FILM COATED ORAL at 14:23

## 2025-05-22 RX ADMIN — SENNOSIDES, DOCUSATE SODIUM 1 TABLET: 50; 8.6 TABLET, FILM COATED ORAL at 08:40

## 2025-05-22 RX ADMIN — METHOCARBAMOL 1000 MG: 500 TABLET ORAL at 20:28

## 2025-05-22 RX ADMIN — SODIUM CHLORIDE: 0.9 INJECTION, SOLUTION INTRAVENOUS at 09:56

## 2025-05-22 RX ADMIN — IBUPROFEN 400 MG: 400 TABLET, FILM COATED ORAL at 20:28

## 2025-05-22 ASSESSMENT — PAIN DESCRIPTION - ORIENTATION
ORIENTATION: POSTERIOR
ORIENTATION: MID;LEFT;RIGHT
ORIENTATION: RIGHT;LEFT;MID
ORIENTATION: MID;LOWER
ORIENTATION: POSTERIOR
ORIENTATION: POSTERIOR
ORIENTATION: MID;LOWER
ORIENTATION: POSTERIOR

## 2025-05-22 ASSESSMENT — PAIN DESCRIPTION - DESCRIPTORS
DESCRIPTORS: DISCOMFORT;SHARP;STABBING
DESCRIPTORS: ACHING;DISCOMFORT;SHARP
DESCRIPTORS: ACHING;DISCOMFORT;SHARP
DESCRIPTORS: SHARP;STABBING;BURNING
DESCRIPTORS: ACHING;DISCOMFORT;SORE
DESCRIPTORS: ACHING;DISCOMFORT;SORE
DESCRIPTORS: ACHING;SHOOTING;THROBBING

## 2025-05-22 ASSESSMENT — PAIN DESCRIPTION - LOCATION
LOCATION: RIB CAGE
LOCATION: BACK
LOCATION: RIB CAGE
LOCATION: BACK
LOCATION: BACK;RIB CAGE
LOCATION: BACK
LOCATION: RIB CAGE;BACK
LOCATION: BACK

## 2025-05-22 ASSESSMENT — PAIN DESCRIPTION - ONSET
ONSET: ON-GOING

## 2025-05-22 ASSESSMENT — PAIN DESCRIPTION - PAIN TYPE
TYPE: ACUTE PAIN

## 2025-05-22 ASSESSMENT — PAIN SCALES - GENERAL
PAINLEVEL_OUTOF10: 6
PAINLEVEL_OUTOF10: 9
PAINLEVEL_OUTOF10: 4
PAINLEVEL_OUTOF10: 6
PAINLEVEL_OUTOF10: 6
PAINLEVEL_OUTOF10: 9
PAINLEVEL_OUTOF10: 5
PAINLEVEL_OUTOF10: 8
PAINLEVEL_OUTOF10: 5
PAINLEVEL_OUTOF10: 7
PAINLEVEL_OUTOF10: 8
PAINLEVEL_OUTOF10: 7
PAINLEVEL_OUTOF10: 6
PAINLEVEL_OUTOF10: 9

## 2025-05-22 ASSESSMENT — PAIN DESCRIPTION - FREQUENCY
FREQUENCY: INTERMITTENT
FREQUENCY: CONTINUOUS
FREQUENCY: CONTINUOUS
FREQUENCY: INTERMITTENT
FREQUENCY: INTERMITTENT

## 2025-05-23 PROCEDURE — 2700000000 HC OXYGEN THERAPY PER DAY

## 2025-05-23 PROCEDURE — 97530 THERAPEUTIC ACTIVITIES: CPT

## 2025-05-23 PROCEDURE — 6370000000 HC RX 637 (ALT 250 FOR IP): Performed by: STUDENT IN AN ORGANIZED HEALTH CARE EDUCATION/TRAINING PROGRAM

## 2025-05-23 PROCEDURE — 1200000000 HC SEMI PRIVATE

## 2025-05-23 PROCEDURE — 99232 SBSQ HOSP IP/OBS MODERATE 35: CPT | Performed by: SURGERY

## 2025-05-23 PROCEDURE — 6370000000 HC RX 637 (ALT 250 FOR IP)

## 2025-05-23 PROCEDURE — 2500000003 HC RX 250 WO HCPCS

## 2025-05-23 PROCEDURE — 2580000003 HC RX 258

## 2025-05-23 PROCEDURE — 6360000002 HC RX W HCPCS

## 2025-05-23 PROCEDURE — 97535 SELF CARE MNGMENT TRAINING: CPT

## 2025-05-23 PROCEDURE — 6370000000 HC RX 637 (ALT 250 FOR IP): Performed by: NURSE PRACTITIONER

## 2025-05-23 RX ORDER — LIDOCAINE 4 G/G
1 PATCH TOPICAL DAILY
Status: DISCONTINUED | OUTPATIENT
Start: 2025-05-23 | End: 2025-05-25 | Stop reason: HOSPADM

## 2025-05-23 RX ADMIN — METHOCARBAMOL 1000 MG: 500 TABLET ORAL at 12:32

## 2025-05-23 RX ADMIN — HYDROCODONE BITARTRATE AND ACETAMINOPHEN 2 TABLET: 5; 325 TABLET ORAL at 12:25

## 2025-05-23 RX ADMIN — ACETAMINOPHEN 650 MG: 325 TABLET ORAL at 05:57

## 2025-05-23 RX ADMIN — POLYETHYLENE GLYCOL 3350 17 G: 17 POWDER, FOR SOLUTION ORAL at 09:47

## 2025-05-23 RX ADMIN — METHOCARBAMOL 1000 MG: 500 TABLET ORAL at 17:30

## 2025-05-23 RX ADMIN — IBUPROFEN 400 MG: 400 TABLET, FILM COATED ORAL at 09:47

## 2025-05-23 RX ADMIN — ENOXAPARIN SODIUM 30 MG: 100 INJECTION SUBCUTANEOUS at 20:41

## 2025-05-23 RX ADMIN — SODIUM CHLORIDE, PRESERVATIVE FREE 10 ML: 5 INJECTION INTRAVENOUS at 20:49

## 2025-05-23 RX ADMIN — IBUPROFEN 400 MG: 400 TABLET, FILM COATED ORAL at 20:41

## 2025-05-23 RX ADMIN — IBUPROFEN 400 MG: 400 TABLET, FILM COATED ORAL at 15:37

## 2025-05-23 RX ADMIN — TAMSULOSIN HYDROCHLORIDE 0.4 MG: 0.4 CAPSULE ORAL at 09:47

## 2025-05-23 RX ADMIN — METHOCARBAMOL 1000 MG: 500 TABLET ORAL at 09:47

## 2025-05-23 RX ADMIN — HYDROCODONE BITARTRATE AND ACETAMINOPHEN 2 TABLET: 5; 325 TABLET ORAL at 06:06

## 2025-05-23 RX ADMIN — IBUPROFEN 400 MG: 400 TABLET, FILM COATED ORAL at 02:13

## 2025-05-23 RX ADMIN — ENOXAPARIN SODIUM 30 MG: 100 INJECTION SUBCUTANEOUS at 09:47

## 2025-05-23 RX ADMIN — SODIUM CHLORIDE: 0.9 INJECTION, SOLUTION INTRAVENOUS at 00:01

## 2025-05-23 RX ADMIN — HYDROCODONE BITARTRATE AND ACETAMINOPHEN 1 TABLET: 5; 325 TABLET ORAL at 17:47

## 2025-05-23 RX ADMIN — HYDROCODONE BITARTRATE AND ACETAMINOPHEN 2 TABLET: 5; 325 TABLET ORAL at 02:13

## 2025-05-23 RX ADMIN — SENNOSIDES, DOCUSATE SODIUM 1 TABLET: 50; 8.6 TABLET, FILM COATED ORAL at 09:48

## 2025-05-23 RX ADMIN — Medication 10 MG: at 20:42

## 2025-05-23 RX ADMIN — SENNOSIDES, DOCUSATE SODIUM 1 TABLET: 50; 8.6 TABLET, FILM COATED ORAL at 20:41

## 2025-05-23 RX ADMIN — HYDROCODONE BITARTRATE AND ACETAMINOPHEN 1 TABLET: 5; 325 TABLET ORAL at 22:09

## 2025-05-23 RX ADMIN — SODIUM CHLORIDE, PRESERVATIVE FREE 10 ML: 5 INJECTION INTRAVENOUS at 09:48

## 2025-05-23 RX ADMIN — METHOCARBAMOL 1000 MG: 500 TABLET ORAL at 20:39

## 2025-05-23 ASSESSMENT — PAIN SCALES - GENERAL
PAINLEVEL_OUTOF10: 9
PAINLEVEL_OUTOF10: 9
PAINLEVEL_OUTOF10: 6
PAINLEVEL_OUTOF10: 8
PAINLEVEL_OUTOF10: 7
PAINLEVEL_OUTOF10: 7
PAINLEVEL_OUTOF10: 10
PAINLEVEL_OUTOF10: 7
PAINLEVEL_OUTOF10: 7

## 2025-05-23 ASSESSMENT — PAIN DESCRIPTION - FREQUENCY
FREQUENCY: INTERMITTENT

## 2025-05-23 ASSESSMENT — PAIN DESCRIPTION - PAIN TYPE
TYPE: ACUTE PAIN;SURGICAL PAIN
TYPE: ACUTE PAIN

## 2025-05-23 ASSESSMENT — PAIN DESCRIPTION - ONSET
ONSET: GRADUAL
ONSET: ON-GOING
ONSET: ON-GOING
ONSET: GRADUAL

## 2025-05-23 ASSESSMENT — PAIN DESCRIPTION - DESCRIPTORS
DESCRIPTORS: ACHING;DISCOMFORT;SHOOTING;THROBBING
DESCRIPTORS: ACHING;SORE;THROBBING
DESCRIPTORS: ACHING;DISCOMFORT;SORE
DESCRIPTORS: ACHING;DISCOMFORT;SORE
DESCRIPTORS: ACHING;SHARP;DISCOMFORT
DESCRIPTORS: ACHING;NAGGING;DULL

## 2025-05-23 ASSESSMENT — PAIN DESCRIPTION - ORIENTATION
ORIENTATION: RIGHT
ORIENTATION: MID;LOWER;LEFT;RIGHT
ORIENTATION: LOWER
ORIENTATION: LEFT
ORIENTATION: LEFT;RIGHT
ORIENTATION: MID;LEFT;RIGHT;LOWER

## 2025-05-23 ASSESSMENT — PAIN DESCRIPTION - LOCATION
LOCATION: RIB CAGE
LOCATION: RIB CAGE;BACK
LOCATION: RIB CAGE;BACK
LOCATION: SHOULDER
LOCATION: RIB CAGE
LOCATION: BACK

## 2025-05-23 ASSESSMENT — PAIN - FUNCTIONAL ASSESSMENT

## 2025-05-23 NOTE — PLAN OF CARE
Problem: Discharge Planning  Goal: Discharge to home or other facility with appropriate resources  5/23/2025 0358 by Benito Salcido RN  Outcome: Progressing  5/23/2025 0357 by Benito Salcido RN  Outcome: Progressing  5/22/2025 1832 by Delfino Burks RN  Outcome: Progressing     Problem: Pain  Goal: Verbalizes/displays adequate comfort level or baseline comfort level  5/23/2025 0358 by Benito Salcido RN  Outcome: Progressing  5/23/2025 0357 by Benito Salcido RN  Outcome: Progressing  5/22/2025 1832 by Delfino Burks RN  Outcome: Progressing     Problem: Skin/Tissue Integrity  Goal: Skin integrity remains intact  Description: 1.  Monitor for areas of redness and/or skin breakdown2.  Assess vascular access sites hourly3.  Every 4-6 hours minimum:  Change oxygen saturation probe site4.  Every 4-6 hours:  If on nasal continuous positive airway pressure, respiratory therapy assess nares and determine need for appliance change or resting period  5/23/2025 0358 by Benito Salcido RN  Outcome: Progressing  Flowsheets (Taken 5/23/2025 0358)  Skin Integrity Remains Intact: Monitor skin under medical devices  Note: Blisters noted to left and right side of abdomen  5/23/2025 0357 by Benito Salcido RN  Outcome: Progressing  5/22/2025 1832 by Delfino Burks RN  Outcome: Progressing     Problem: Safety - Adult  Goal: Free from fall injury  5/23/2025 0358 by Benito Salcido RN  Outcome: Progressing  5/23/2025 0357 by Benito Salcido RN  Outcome: Progressing  5/22/2025 1832 by Delfino Burks RN  Outcome: Progressing     Problem: Respiratory - Adult  Goal: Achieves optimal ventilation and oxygenation  5/23/2025 0358 by Benito Salcido RN  Outcome: Progressing  5/23/2025 0358 by Benito Salcido RN  Flowsheets (Taken 5/23/2025 0358)  Achieves optimal ventilation and oxygenation:   Assess for changes in respiratory status   Respiratory therapy support as indicated

## 2025-05-23 NOTE — PLAN OF CARE
Problem: Discharge Planning  Goal: Discharge to home or other facility with appropriate resources  5/23/2025 1631 by Lindy Yo RN  Outcome: Progressing     Problem: Pain  Goal: Verbalizes/displays adequate comfort level or baseline comfort level  5/23/2025 1631 by Lindy Yo RN  Outcome: Progressing     Problem: Skin/Tissue Integrity  Goal: Skin integrity remains intact  Description: 1.  Monitor for areas of redness and/or skin breakdown2.  Assess vascular access sites hourly3.  Every 4-6 hours minimum:  Change oxygen saturation probe site4.  Every 4-6 hours:  If on nasal continuous positive airway pressure, respiratory therapy assess nares and determine need for appliance change or resting period  5/23/2025 1631 by Lindy Yo RN  Outcome: Progressing     Problem: Safety - Adult  Goal: Free from fall injury  5/23/2025 1631 by Lindy Yo RN  Outcome: Progressing     Problem: Respiratory - Adult  Goal: Achieves optimal ventilation and oxygenation  5/23/2025 1631 by Lindy Yo RN  Outcome: Progressing     Problem: Nutrition Deficit:  Goal: Optimize nutritional status  Outcome: Progressing

## 2025-05-24 PROCEDURE — 1200000000 HC SEMI PRIVATE

## 2025-05-24 PROCEDURE — 6370000000 HC RX 637 (ALT 250 FOR IP): Performed by: NURSE PRACTITIONER

## 2025-05-24 PROCEDURE — 6370000000 HC RX 637 (ALT 250 FOR IP)

## 2025-05-24 PROCEDURE — 93005 ELECTROCARDIOGRAM TRACING: CPT

## 2025-05-24 PROCEDURE — 6360000002 HC RX W HCPCS

## 2025-05-24 PROCEDURE — 97530 THERAPEUTIC ACTIVITIES: CPT

## 2025-05-24 PROCEDURE — 99232 SBSQ HOSP IP/OBS MODERATE 35: CPT | Performed by: SURGERY

## 2025-05-24 PROCEDURE — 6370000000 HC RX 637 (ALT 250 FOR IP): Performed by: STUDENT IN AN ORGANIZED HEALTH CARE EDUCATION/TRAINING PROGRAM

## 2025-05-24 PROCEDURE — 2500000003 HC RX 250 WO HCPCS

## 2025-05-24 RX ORDER — METHOCARBAMOL 1000 MG/1
1000 TABLET, FILM COATED ORAL 4 TIMES DAILY
Qty: 40 TABLET | Refills: 0 | Status: SHIPPED | OUTPATIENT
Start: 2025-05-24 | End: 2025-06-03

## 2025-05-24 RX ORDER — HYDROCODONE BITARTRATE AND ACETAMINOPHEN 5; 325 MG/1; MG/1
1 TABLET ORAL EVERY 6 HOURS PRN
Qty: 28 TABLET | Refills: 0 | Status: SHIPPED | OUTPATIENT
Start: 2025-05-24 | End: 2025-05-30

## 2025-05-24 RX ORDER — IBUPROFEN 400 MG/1
400 TABLET, FILM COATED ORAL EVERY 6 HOURS
Qty: 120 TABLET | Refills: 3 | Status: SHIPPED | OUTPATIENT
Start: 2025-05-24

## 2025-05-24 RX ORDER — BACITRACIN ZINC 500 [USP'U]/G
OINTMENT TOPICAL 3 TIMES DAILY
Status: DISCONTINUED | OUTPATIENT
Start: 2025-05-24 | End: 2025-05-24

## 2025-05-24 RX ORDER — POLYETHYLENE GLYCOL 3350 17 G/17G
17 POWDER, FOR SOLUTION ORAL DAILY
Qty: 30 PACKET | Refills: 0 | Status: SHIPPED | OUTPATIENT
Start: 2025-05-24 | End: 2025-06-23

## 2025-05-24 RX ORDER — BACITRACIN ZINC 500 [USP'U]/G
OINTMENT TOPICAL
Qty: 30 G | Refills: 1 | Status: SHIPPED | OUTPATIENT
Start: 2025-05-24 | End: 2025-06-03

## 2025-05-24 RX ORDER — BACITRACIN ZINC 500 [USP'U]/G
OINTMENT TOPICAL 3 TIMES DAILY
Status: DISCONTINUED | OUTPATIENT
Start: 2025-05-24 | End: 2025-05-25 | Stop reason: HOSPADM

## 2025-05-24 RX ADMIN — IBUPROFEN 400 MG: 400 TABLET, FILM COATED ORAL at 13:17

## 2025-05-24 RX ADMIN — POLYETHYLENE GLYCOL 3350 17 G: 17 POWDER, FOR SOLUTION ORAL at 07:53

## 2025-05-24 RX ADMIN — HYDROCODONE BITARTRATE AND ACETAMINOPHEN 2 TABLET: 5; 325 TABLET ORAL at 07:52

## 2025-05-24 RX ADMIN — BACITRACIN ZINC: 500 OINTMENT TOPICAL at 21:38

## 2025-05-24 RX ADMIN — IBUPROFEN 400 MG: 400 TABLET, FILM COATED ORAL at 21:38

## 2025-05-24 RX ADMIN — HYDROCODONE BITARTRATE AND ACETAMINOPHEN 2 TABLET: 5; 325 TABLET ORAL at 13:17

## 2025-05-24 RX ADMIN — Medication 10 MG: at 21:38

## 2025-05-24 RX ADMIN — ENOXAPARIN SODIUM 30 MG: 100 INJECTION SUBCUTANEOUS at 07:53

## 2025-05-24 RX ADMIN — BACITRACIN ZINC: 500 OINTMENT TOPICAL at 13:19

## 2025-05-24 RX ADMIN — METHOCARBAMOL 1000 MG: 500 TABLET ORAL at 07:52

## 2025-05-24 RX ADMIN — METHOCARBAMOL 1000 MG: 500 TABLET ORAL at 21:37

## 2025-05-24 RX ADMIN — SODIUM CHLORIDE, PRESERVATIVE FREE 10 ML: 5 INJECTION INTRAVENOUS at 07:54

## 2025-05-24 RX ADMIN — HYDROCODONE BITARTRATE AND ACETAMINOPHEN 2 TABLET: 5; 325 TABLET ORAL at 21:38

## 2025-05-24 RX ADMIN — METHOCARBAMOL 1000 MG: 500 TABLET ORAL at 17:29

## 2025-05-24 RX ADMIN — HYDROCODONE BITARTRATE AND ACETAMINOPHEN 2 TABLET: 5; 325 TABLET ORAL at 17:28

## 2025-05-24 RX ADMIN — IBUPROFEN 400 MG: 400 TABLET, FILM COATED ORAL at 07:52

## 2025-05-24 RX ADMIN — HYDROCODONE BITARTRATE AND ACETAMINOPHEN 2 TABLET: 5; 325 TABLET ORAL at 02:25

## 2025-05-24 RX ADMIN — TAMSULOSIN HYDROCHLORIDE 0.4 MG: 0.4 CAPSULE ORAL at 07:52

## 2025-05-24 RX ADMIN — IBUPROFEN 400 MG: 400 TABLET, FILM COATED ORAL at 02:29

## 2025-05-24 RX ADMIN — BACITRACIN ZINC: 500 OINTMENT TOPICAL at 07:54

## 2025-05-24 RX ADMIN — SENNOSIDES, DOCUSATE SODIUM 1 TABLET: 50; 8.6 TABLET, FILM COATED ORAL at 07:52

## 2025-05-24 RX ADMIN — ENOXAPARIN SODIUM 30 MG: 100 INJECTION SUBCUTANEOUS at 21:37

## 2025-05-24 RX ADMIN — METHOCARBAMOL 1000 MG: 500 TABLET ORAL at 13:17

## 2025-05-24 ASSESSMENT — PAIN DESCRIPTION - DESCRIPTORS
DESCRIPTORS: DISCOMFORT;SHARP;STABBING
DESCRIPTORS: ACHING;DISCOMFORT;THROBBING
DESCRIPTORS: ACHING;THROBBING;SPASM
DESCRIPTORS: ACHING;BURNING;SORE
DESCRIPTORS: ACHING;DISCOMFORT;SHARP

## 2025-05-24 ASSESSMENT — PAIN DESCRIPTION - ORIENTATION
ORIENTATION: POSTERIOR;LEFT
ORIENTATION: LEFT;POSTERIOR
ORIENTATION: LEFT
ORIENTATION: POSTERIOR
ORIENTATION: RIGHT;LOWER;MID

## 2025-05-24 ASSESSMENT — PAIN SCALES - GENERAL
PAINLEVEL_OUTOF10: 10
PAINLEVEL_OUTOF10: 8
PAINLEVEL_OUTOF10: 6
PAINLEVEL_OUTOF10: 7
PAINLEVEL_OUTOF10: 6
PAINLEVEL_OUTOF10: 7
PAINLEVEL_OUTOF10: 8
PAINLEVEL_OUTOF10: 8

## 2025-05-24 ASSESSMENT — PAIN - FUNCTIONAL ASSESSMENT
PAIN_FUNCTIONAL_ASSESSMENT: PREVENTS OR INTERFERES SOME ACTIVE ACTIVITIES AND ADLS

## 2025-05-24 ASSESSMENT — PAIN DESCRIPTION - FREQUENCY
FREQUENCY: INTERMITTENT

## 2025-05-24 ASSESSMENT — PAIN DESCRIPTION - PAIN TYPE
TYPE: ACUTE PAIN;INTRACTABLE PAIN
TYPE: ACUTE PAIN

## 2025-05-24 ASSESSMENT — PAIN DESCRIPTION - ONSET
ONSET: ON-GOING

## 2025-05-24 ASSESSMENT — PAIN DESCRIPTION - LOCATION
LOCATION: BACK
LOCATION: BACK;RIB CAGE
LOCATION: BACK;ABDOMEN
LOCATION: RIB CAGE;BACK

## 2025-05-24 NOTE — PLAN OF CARE
Problem: Discharge Planning  Goal: Discharge to home or other facility with appropriate resources  5/24/2025 1717 by Delfino Burks RN  Outcome: Progressing  5/24/2025 0353 by Beth Kim RN  Outcome: Progressing     Problem: Pain  Goal: Verbalizes/displays adequate comfort level or baseline comfort level  5/24/2025 1717 by Delfino Burks RN  Outcome: Progressing  5/24/2025 0353 by Beth Kim RN  Outcome: Progressing     Problem: Skin/Tissue Integrity  Goal: Skin integrity remains intact  Description: 1.  Monitor for areas of redness and/or skin breakdown2.  Assess vascular access sites hourly3.  Every 4-6 hours minimum:  Change oxygen saturation probe site4.  Every 4-6 hours:  If on nasal continuous positive airway pressure, respiratory therapy assess nares and determine need for appliance change or resting period  5/24/2025 1717 by Delfino Burks RN  Outcome: Progressing  5/24/2025 0353 by Beth Kim RN  Outcome: Progressing     Problem: Safety - Adult  Goal: Free from fall injury  5/24/2025 1717 by Delfino Burks RN  Outcome: Progressing  5/24/2025 0353 by Beth Kim RN  Outcome: Progressing     Problem: Respiratory - Adult  Goal: Achieves optimal ventilation and oxygenation  Outcome: Progressing     Problem: Nutrition Deficit:  Goal: Optimize nutritional status  Outcome: Progressing

## 2025-05-24 NOTE — PLAN OF CARE
Problem: Discharge Planning  Goal: Discharge to home or other facility with appropriate resources  5/24/2025 0353 by Beth Kim RN  Outcome: Progressing  5/23/2025 1631 by Lindy Yo RN  Outcome: Progressing     Problem: Pain  Goal: Verbalizes/displays adequate comfort level or baseline comfort level  5/24/2025 0353 by Beth Kim RN  Outcome: Progressing  5/23/2025 1631 by Lindy Yo RN  Outcome: Progressing     Problem: Skin/Tissue Integrity  Goal: Skin integrity remains intact  Description: 1.  Monitor for areas of redness and/or skin breakdown2.  Assess vascular access sites hourly3.  Every 4-6 hours minimum:  Change oxygen saturation probe site4.  Every 4-6 hours:  If on nasal continuous positive airway pressure, respiratory therapy assess nares and determine need for appliance change or resting period  5/24/2025 0353 by Beth Kim RN  Outcome: Progressing  5/23/2025 1631 by Lindy Yo RN  Outcome: Progressing     Problem: Safety - Adult  Goal: Free from fall injury  5/24/2025 0353 by Beth Kim RN  Outcome: Progressing  5/23/2025 1631 by Lindy Yo RN  Outcome: Progressing     Problem: Respiratory - Adult  Goal: Achieves optimal ventilation and oxygenation  5/23/2025 1631 by Lindy Yo RN  Outcome: Progressing     Problem: Nutrition Deficit:  Goal: Optimize nutritional status  5/23/2025 1631 by Lindy Yo RN  Outcome: Progressing

## 2025-05-25 VITALS
HEIGHT: 71 IN | DIASTOLIC BLOOD PRESSURE: 89 MMHG | OXYGEN SATURATION: 95 % | BODY MASS INDEX: 26.6 KG/M2 | RESPIRATION RATE: 16 BRPM | HEART RATE: 73 BPM | WEIGHT: 190 LBS | SYSTOLIC BLOOD PRESSURE: 145 MMHG | TEMPERATURE: 97.9 F

## 2025-05-25 LAB
EKG ATRIAL RATE: 83 BPM
EKG P AXIS: 54 DEGREES
EKG P-R INTERVAL: 138 MS
EKG Q-T INTERVAL: 404 MS
EKG QRS DURATION: 80 MS
EKG QTC CALCULATION (BAZETT): 474 MS
EKG R AXIS: 42 DEGREES
EKG T AXIS: 17 DEGREES
EKG VENTRICULAR RATE: 83 BPM

## 2025-05-25 PROCEDURE — 93010 ELECTROCARDIOGRAM REPORT: CPT | Performed by: INTERNAL MEDICINE

## 2025-05-25 PROCEDURE — 6370000000 HC RX 637 (ALT 250 FOR IP): Performed by: NURSE PRACTITIONER

## 2025-05-25 PROCEDURE — 6370000000 HC RX 637 (ALT 250 FOR IP): Performed by: STUDENT IN AN ORGANIZED HEALTH CARE EDUCATION/TRAINING PROGRAM

## 2025-05-25 PROCEDURE — 6360000002 HC RX W HCPCS

## 2025-05-25 PROCEDURE — 2500000003 HC RX 250 WO HCPCS

## 2025-05-25 PROCEDURE — 6370000000 HC RX 637 (ALT 250 FOR IP)

## 2025-05-25 PROCEDURE — 99238 HOSP IP/OBS DSCHRG MGMT 30/<: CPT | Performed by: SURGERY

## 2025-05-25 RX ORDER — GABAPENTIN 100 MG/1
100 CAPSULE ORAL 3 TIMES DAILY
Qty: 21 CAPSULE | Refills: 0 | Status: SHIPPED | OUTPATIENT
Start: 2025-05-25 | End: 2025-05-30

## 2025-05-25 RX ORDER — GABAPENTIN 100 MG/1
100 CAPSULE ORAL 3 TIMES DAILY
Status: DISCONTINUED | OUTPATIENT
Start: 2025-05-25 | End: 2025-05-25 | Stop reason: HOSPADM

## 2025-05-25 RX ADMIN — BACITRACIN ZINC: 500 OINTMENT TOPICAL at 09:30

## 2025-05-25 RX ADMIN — IBUPROFEN 400 MG: 400 TABLET, FILM COATED ORAL at 13:49

## 2025-05-25 RX ADMIN — TAMSULOSIN HYDROCHLORIDE 0.4 MG: 0.4 CAPSULE ORAL at 09:29

## 2025-05-25 RX ADMIN — METHOCARBAMOL 1000 MG: 500 TABLET ORAL at 13:49

## 2025-05-25 RX ADMIN — SENNOSIDES, DOCUSATE SODIUM 1 TABLET: 50; 8.6 TABLET, FILM COATED ORAL at 09:29

## 2025-05-25 RX ADMIN — HYDROCODONE BITARTRATE AND ACETAMINOPHEN 2 TABLET: 5; 325 TABLET ORAL at 02:27

## 2025-05-25 RX ADMIN — SODIUM CHLORIDE, PRESERVATIVE FREE 10 ML: 5 INJECTION INTRAVENOUS at 09:37

## 2025-05-25 RX ADMIN — POLYETHYLENE GLYCOL 3350 17 G: 17 POWDER, FOR SOLUTION ORAL at 09:29

## 2025-05-25 RX ADMIN — IBUPROFEN 400 MG: 400 TABLET, FILM COATED ORAL at 09:29

## 2025-05-25 RX ADMIN — ENOXAPARIN SODIUM 30 MG: 100 INJECTION SUBCUTANEOUS at 09:29

## 2025-05-25 RX ADMIN — HYDROCODONE BITARTRATE AND ACETAMINOPHEN 2 TABLET: 5; 325 TABLET ORAL at 09:28

## 2025-05-25 RX ADMIN — HYDROCODONE BITARTRATE AND ACETAMINOPHEN 2 TABLET: 5; 325 TABLET ORAL at 14:19

## 2025-05-25 RX ADMIN — BACITRACIN ZINC: 500 OINTMENT TOPICAL at 13:49

## 2025-05-25 RX ADMIN — GABAPENTIN 100 MG: 100 CAPSULE ORAL at 09:28

## 2025-05-25 RX ADMIN — IBUPROFEN 400 MG: 400 TABLET, FILM COATED ORAL at 02:27

## 2025-05-25 RX ADMIN — METHOCARBAMOL 1000 MG: 500 TABLET ORAL at 09:28

## 2025-05-25 RX ADMIN — GABAPENTIN 100 MG: 100 CAPSULE ORAL at 13:49

## 2025-05-25 ASSESSMENT — PAIN SCALES - GENERAL
PAINLEVEL_OUTOF10: 7
PAINLEVEL_OUTOF10: 9
PAINLEVEL_OUTOF10: 8
PAINLEVEL_OUTOF10: 7

## 2025-05-25 ASSESSMENT — PAIN DESCRIPTION - LOCATION
LOCATION: SHOULDER
LOCATION: SHOULDER

## 2025-05-25 ASSESSMENT — PAIN DESCRIPTION - ONSET: ONSET: GRADUAL

## 2025-05-25 ASSESSMENT — PAIN DESCRIPTION - ORIENTATION
ORIENTATION: RIGHT;LEFT
ORIENTATION: RIGHT;LEFT

## 2025-05-25 ASSESSMENT — PAIN - FUNCTIONAL ASSESSMENT: PAIN_FUNCTIONAL_ASSESSMENT: PREVENTS OR INTERFERES SOME ACTIVE ACTIVITIES AND ADLS

## 2025-05-25 ASSESSMENT — PAIN DESCRIPTION - DESCRIPTORS
DESCRIPTORS: SHARP
DESCRIPTORS: SORE;THROBBING;ACHING

## 2025-05-25 ASSESSMENT — PAIN DESCRIPTION - PAIN TYPE: TYPE: ACUTE PAIN

## 2025-05-25 ASSESSMENT — PAIN DESCRIPTION - FREQUENCY: FREQUENCY: INTERMITTENT

## 2025-05-25 NOTE — PLAN OF CARE
Problem: Discharge Planning  Goal: Discharge to home or other facility with appropriate resources  5/25/2025 1646 by Lindy Yo RN  Outcome: Completed     Problem: Pain  Goal: Verbalizes/displays adequate comfort level or baseline comfort level  5/25/2025 1646 by Lindy Yo RN  Outcome: Completed     Problem: Skin/Tissue Integrity  Goal: Skin integrity remains intact  Description: 1.  Monitor for areas of redness and/or skin breakdown2.  Assess vascular access sites hourly3.  Every 4-6 hours minimum:  Change oxygen saturation probe site4.  Every 4-6 hours:  If on nasal continuous positive airway pressure, respiratory therapy assess nares and determine need for appliance change or resting period  5/25/2025 1646 by Lindy Yo RN  Outcome: Completed     Problem: Safety - Adult  Goal: Free from fall injury  5/25/2025 1646 by Lindy Yo, RN  Outcome: Completed     Problem: Respiratory - Adult  Goal: Achieves optimal ventilation and oxygenation  5/25/2025 1646 by Lindy Yo, RN  Outcome: Completed     Problem: Nutrition Deficit:  Goal: Optimize nutritional status  5/25/2025 1646 by Lindy Yo, RN  Outcome: Completed

## 2025-05-25 NOTE — PLAN OF CARE
Problem: Discharge Planning  Goal: Discharge to home or other facility with appropriate resources  5/25/2025 0349 by Benito Salcido RN  Outcome: Progressing  5/24/2025 1717 by Delfino Burks RN  Outcome: Progressing     Problem: Pain  Goal: Verbalizes/displays adequate comfort level or baseline comfort level  5/25/2025 0349 by Benito Salcido RN  Outcome: Progressing  5/24/2025 1717 by Delfino Burks RN  Outcome: Progressing     Problem: Skin/Tissue Integrity  Goal: Skin integrity remains intact  Description: 1.  Monitor for areas of redness and/or skin breakdown2.  Assess vascular access sites hourly3.  Every 4-6 hours minimum:  Change oxygen saturation probe site4.  Every 4-6 hours:  If on nasal continuous positive airway pressure, respiratory therapy assess nares and determine need for appliance change or resting period  5/25/2025 0349 by Benito Salcido RN  Outcome: Progressing  Flowsheets (Taken 5/24/2025 2000)  Skin Integrity Remains Intact: Monitor for areas of redness and/or skin breakdown  5/24/2025 1717 by Delfino Burks RN  Outcome: Progressing     Problem: Safety - Adult  Goal: Free from fall injury  5/25/2025 0349 by Benito Salcido RN  Outcome: Progressing  5/24/2025 1717 by Delfino Burks RN  Outcome: Progressing     Problem: Respiratory - Adult  Goal: Achieves optimal ventilation and oxygenation  5/25/2025 0349 by Benito Salcido RN  Outcome: Progressing  5/24/2025 1717 by Delfino Burks RN  Outcome: Progressing     Problem: Nutrition Deficit:  Goal: Optimize nutritional status  5/25/2025 0349 by Benito Salcido RN  Outcome: Progressing  5/24/2025 1717 by Delfino Burks RN  Outcome: Progressing
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
## 2025-05-30 ENCOUNTER — OFFICE VISIT (OUTPATIENT)
Age: 30
End: 2025-05-30

## 2025-05-30 ENCOUNTER — TELEPHONE (OUTPATIENT)
Age: 30
End: 2025-05-30

## 2025-05-30 VITALS
HEART RATE: 71 BPM | DIASTOLIC BLOOD PRESSURE: 81 MMHG | WEIGHT: 220 LBS | OXYGEN SATURATION: 98 % | SYSTOLIC BLOOD PRESSURE: 119 MMHG | TEMPERATURE: 98.2 F | BODY MASS INDEX: 30.7 KG/M2 | RESPIRATION RATE: 16 BRPM

## 2025-05-30 DIAGNOSIS — R40.20 LOC (LOSS OF CONSCIOUSNESS) (HCC): ICD-10-CM

## 2025-05-30 DIAGNOSIS — S22.42XD CLOSED FRACTURE OF MULTIPLE RIBS OF LEFT SIDE WITH ROUTINE HEALING, SUBSEQUENT ENCOUNTER: ICD-10-CM

## 2025-05-30 DIAGNOSIS — S22.088D OTHER CLOSED FRACTURE OF TWELFTH THORACIC VERTEBRA WITH ROUTINE HEALING, SUBSEQUENT ENCOUNTER: ICD-10-CM
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
## 2025-05-30 RX ORDER — GABAPENTIN 100 MG/1
100 CAPSULE ORAL NIGHTLY
Qty: 7 CAPSULE | Refills: 0 | Status: SHIPPED | OUTPATIENT
Start: 2025-05-30 | End: 2025-06-06

## 2025-05-30 RX ORDER — HYDROCODONE BITARTRATE AND ACETAMINOPHEN 7.5; 325 MG/1; MG/1
1 TABLET ORAL EVERY 6 HOURS PRN
Qty: 28 TABLET | Refills: 0 | Status: SHIPPED | OUTPATIENT
Start: 2025-05-31 | End: 2025-06-07

## 2025-05-30 ASSESSMENT — ENCOUNTER SYMPTOMS
SHORTNESS OF BREATH: 0
CHEST TIGHTNESS: 0
WHEEZING: 0
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
## 2025-05-30 NOTE — PATIENT INSTRUCTIONS
[facility]  Trauma Services  Additional Discharge Instructions    Call [phone] for any questions/concerns.      Please follow the instructions checked below:    Please follow up with your primary care provider.       ACTIVITY INSTRUCTIONS  Increase activity as tolerated  No heavy lifting or strenuous activity  Take your incentive spirometer home and use 4-6 times/day   [x]  No driving until cleared by Tricia    WOUND/DRESSING INSTRUCTIONS:  You may shower.  No sitting in bath tub, hot tub or swimming until cleared by physician.  Ice to areas of pain for first 24 hours.  Heat to areas of pain after that.  Wash areas of lacerations/abrasions with soap & water.  Rinse well.  Pat dry with clean towel.  Apply thin layer of Bacitracin, Neosporin, or triple antibiotic cream to affected area 2-3 times per day.  Keep wounds clean and dry.    MEDICATION INSTRUCTIONS  Take medication as prescribed.  When taking pain medications, you may experience dizziness or drowsiness.  Do not drink alcohol or drive when taking these medications.  You may experience constipation while taking pain medication.  You may take over the counter stool softeners such as docusate (Colace), sennosides S (Senokot-S), or Miralax.   [x]  You may take Ibuprofen (over the counter) as directed for mild pain.  You may take up to 800 mg every 8 hours for pain, please take with food or milk.   [x]  Do not take any other acetaminophen (Tylenol) products if you are taking Percocet or Norco, as these contain Tylenol.  Do NOT take more than 4000mg of Tylenol in 24h.    OPIOID MEDICATION INSTRUCTIONS  Read the medication guide that is included with your prescription.  Take your medication exactly as prescribed.  Store medication away from children and in a safe place.  Do NOT share your medication with others.  Do NOT take medication unless it is prescribed for you.  Do NOT drink alcohol while taking opioids (I.e.,

## 2025-06-02 ENCOUNTER — TELEPHONE (OUTPATIENT)
Age: 30
End: 2025-06-02

## 2025-06-02 DIAGNOSIS — T14.90XA TRAUMA: Primary | ICD-10-CM

## 2025-06-02 DIAGNOSIS — S22.081D CLOSED STABLE BURST FRACTURE OF ELEVENTH THORACIC VERTEBRA WITH ROUTINE HEALING, SUBSEQUENT ENCOUNTER: ICD-10-CM

## 2025-06-02 DIAGNOSIS — S22.081D CLOSED STABLE BURST FRACTURE OF TWELFTH THORACIC VERTEBRA WITH ROUTINE HEALING, SUBSEQUENT ENCOUNTER: ICD-10-CM

## 2025-06-02 DIAGNOSIS — S22.42XD CLOSED FRACTURE OF MULTIPLE RIBS OF LEFT SIDE WITH ROUTINE HEALING, SUBSEQUENT ENCOUNTER: ICD-10-CM

## 2025-06-02 RX ORDER — HYDROCODONE BITARTRATE AND ACETAMINOPHEN 5; 325 MG/1; MG/1
1 TABLET ORAL EVERY 6 HOURS PRN
Qty: 16 TABLET | Refills: 0 | Status: SHIPPED | OUTPATIENT
Start: 2025-06-02 | End: 2025-06-06

## 2025-06-02 RX ORDER — GABAPENTIN 100 MG/1
100 CAPSULE ORAL 3 TIMES DAILY
Qty: 30 CAPSULE | Refills: 0 | Status: SHIPPED | OUTPATIENT
Start: 2025-06-02 | End: 2025-06-06

## 2025-06-02 NOTE — TELEPHONE ENCOUNTER
Discontinued Norco 7.5/325 mg.   Ordered Norco 5/325 mg for 4 days 20 pills  Gabapentin 100 mg tid  Scripts sent to [facility] in [place].      He hs follow up appointment on Friday June 6, 2025.

## 2025-06-02 NOTE — TELEPHONE ENCOUNTER
MA informed patient script were sent to pharmacy. Patient verbalized understanding.  Electronically signed by Xin Magana MA on 6/2/25 at 2:58 PM EDT

## 2025-06-02 NOTE — TELEPHONE ENCOUNTER
MA received a call from patient who states the [facility] will not cover a increase of Norco 5-325mg to 7.5-325mg. [facility] informed pt that Norco 5-325mg would be covered, pt states he would be ok with going back down to Norco 5-325mg to help take the edge off.    MA spoke with Ricardo at Pharmacy Dept at the [facility] ([phone]) to find out why Norco 7.5-325mg is not covered. Norco 5-325mg will be covered on the first fill through [facility], states we could increase after this script if needed.     Patient also questioned why the Gabapentin is decreased to 100mg, take one tab once a day.  Forwarding to Zoe Shannon CNP for further advisement. Patient can be reached at [phone].  Electronically signed by Xin Magana MA on 6/2/25 at 2:42 PM EDT

## 2025-06-05 ENCOUNTER — OFFICE VISIT (OUTPATIENT)
Dept: ORTHOPEDIC SURGERY | Age: 30
End: 2025-06-05

## 2025-06-05 VITALS
DIASTOLIC BLOOD PRESSURE: 78 MMHG | OXYGEN SATURATION: 95 % | SYSTOLIC BLOOD PRESSURE: 121 MMHG | TEMPERATURE: 97.6 F | HEART RATE: 79 BPM

## 2025-06-05 DIAGNOSIS — S43.401A SPRAIN OF RIGHT SHOULDER, UNSPECIFIED SHOULDER SPRAIN TYPE, INITIAL ENCOUNTER: Primary | ICD-10-CM

## 2025-06-05 RX ORDER — IBUPROFEN 800 MG/1
800 TABLET, FILM COATED ORAL EVERY 6 HOURS PRN
Qty: 40 TABLET | Refills: 2 | Status: SHIPPED | OUTPATIENT
Start: 2025-06-05

## 2025-06-05 NOTE — PATIENT INSTRUCTIONS
[facility]  Trauma Services  Additional Discharge Instructions    Call [phone] for any questions/concerns.      Please follow the instructions checked below:  Please follow up with your primary care provider.       ACTIVITY INSTRUCTIONS  Increase activity as tolerated  No heavy lifting or strenuous activity  Take your incentive spirometer home and use 4-6 times/day   [x]  No driving until cleared by [facility], Orthopedic surgery & Neruosurgery    WOUND/DRESSING INSTRUCTIONS:  You may shower.  No sitting in bath tub, hot tub or swimming until cleared by physician.  Ice to areas of pain for first 24 hours.  Heat to areas of pain after that.  Wash areas of lacerations/abrasions with soap & water.  Rinse well.  Pat dry with clean towel.  Apply thin layer of Bacitracin, Neosporin, or triple antibiotic cream to affected area 2-3 times per day.  Keep wounds clean and dry.    MEDICATION INSTRUCTIONS  Take medication as prescribed.  When taking pain medications, you may experience dizziness or drowsiness.  Do not drink alcohol or drive when taking these medications.  You may experience constipation while taking pain medication.  You may take over the counter stool softeners such as docusate (Colace), sennosides S (Senokot-S), or Miralax.   [x]  You may take Ibuprofen (over the counter) as directed for mild pain.  You may take up to 800 mg every 8 hours for pain, please take with food or milk.   [x] Do NOT take more than 4000mg of Tylenol in 24h.    OPIOID MEDICATION INSTRUCTIONS  Read the medication guide that is included with your prescription.  Take your medication exactly as prescribed.  Store medication away from children and in a safe place.  Do NOT share your medication with others.  Do NOT take medication unless it is prescribed for you.  Do NOT drink alcohol while taking opioids (I.e., Norco, Percocet, Oxycodone, etc).  Discuss with the Trauma Clinic staff if the dose of
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
## 2025-06-06 ENCOUNTER — OFFICE VISIT (OUTPATIENT)
Age: 30
End: 2025-06-06

## 2025-06-06 VITALS
TEMPERATURE: 98 F | OXYGEN SATURATION: 97 % | RESPIRATION RATE: 16 BRPM | HEART RATE: 69 BPM | DIASTOLIC BLOOD PRESSURE: 86 MMHG | SYSTOLIC BLOOD PRESSURE: 129 MMHG | WEIGHT: 220 LBS | BODY MASS INDEX: 30.7 KG/M2
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
## 2025-06-06 DIAGNOSIS — T14.90XA TRAUMA: Primary | ICD-10-CM

## 2025-06-06 DIAGNOSIS — R40.20 LOC (LOSS OF CONSCIOUSNESS) (HCC): ICD-10-CM

## 2025-06-06 DIAGNOSIS — S22.081D CLOSED STABLE BURST FRACTURE OF TWELFTH THORACIC VERTEBRA WITH ROUTINE HEALING, SUBSEQUENT ENCOUNTER: ICD-10-CM

## 2025-06-06 DIAGNOSIS — S22.081D CLOSED STABLE BURST FRACTURE OF ELEVENTH THORACIC VERTEBRA WITH ROUTINE HEALING, SUBSEQUENT ENCOUNTER: ICD-10-CM

## 2025-06-06 DIAGNOSIS — S22.42XD CLOSED FRACTURE OF MULTIPLE RIBS OF LEFT SIDE WITH ROUTINE HEALING, SUBSEQUENT ENCOUNTER: ICD-10-CM

## 2025-06-06 RX ORDER — GABAPENTIN 100 MG/1
100 CAPSULE ORAL 3 TIMES DAILY
Qty: 90 CAPSULE | Refills: 1 | Status: SHIPPED | OUTPATIENT
Start: 2025-06-06 | End: 2025-07-06

## 2025-06-06 RX ORDER — METHOCARBAMOL 500 MG/1
1000 TABLET, FILM COATED ORAL 3 TIMES DAILY
Qty: 90 TABLET | Refills: 1 | Status: SHIPPED | OUTPATIENT
Start: 2025-06-06 | End: 2025-07-06

## 2025-06-06 RX ORDER — HYDROCODONE BITARTRATE AND ACETAMINOPHEN 5; 325 MG/1; MG/1
1 TABLET ORAL EVERY 6 HOURS PRN
Qty: 28 TABLET | Refills: 0 | Status: SHIPPED | OUTPATIENT
Start: 2025-06-06 | End: 2025-06-13

## 2025-06-06 ASSESSMENT — ENCOUNTER SYMPTOMS: ABDOMINAL DISTENTION: 1

## 2025-06-12 NOTE — PATIENT INSTRUCTIONS
code.  https://emergency.cdc.gov/coping/selfcare.asp  https://www.ptsd.va.gov/index.asp  https://www.cdc.gov/masstrauma/factsheets/professionals/coping_professional.pdf      https://www.ptsd.va.gov/understand/related/acute_stress.asp  Resources  Trauma Survivors Network:  traumasurvivorsnetwork.org  Substance Abuse and Mental Health Services Administration:  [facility].gov.    If you are having a crisis & need help now:    Text HOME to 96058 to connect with a volunteer Crisis Counselor  Call 988 for the Suicide & Crisis Lifeline  Call 3-488-292-QFFB (6733). U.S. National Suicide Prevention Lifeline.    Go the nearest emergency department.
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
## 2025-06-13 ENCOUNTER — OFFICE VISIT (OUTPATIENT)
Age: 30
End: 2025-06-13

## 2025-06-13 VITALS
SYSTOLIC BLOOD PRESSURE: 117 MMHG | OXYGEN SATURATION: 96 % | WEIGHT: 220 LBS | RESPIRATION RATE: 16 BRPM | TEMPERATURE: 97.2 F | DIASTOLIC BLOOD PRESSURE: 75 MMHG | HEART RATE: 95 BPM | BODY MASS INDEX: 30.7 KG/M2

## 2025-06-13 DIAGNOSIS — S22.081D CLOSED STABLE BURST FRACTURE OF ELEVENTH THORACIC VERTEBRA WITH ROUTINE HEALING, SUBSEQUENT ENCOUNTER: ICD-10-CM

## 2025-06-13 DIAGNOSIS — T14.90XA TRAUMA: Primary | ICD-10-CM
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
## 2025-06-13 DIAGNOSIS — S22.42XD CLOSED FRACTURE OF MULTIPLE RIBS OF LEFT SIDE WITH ROUTINE HEALING, SUBSEQUENT ENCOUNTER: ICD-10-CM

## 2025-06-13 RX ORDER — GABAPENTIN 100 MG/1
100 CAPSULE ORAL 3 TIMES DAILY
Qty: 90 CAPSULE | Refills: 0 | Status: SHIPPED | OUTPATIENT
Start: 2025-06-13 | End: 2025-07-13

## 2025-06-13 RX ORDER — HYDROCODONE BITARTRATE AND ACETAMINOPHEN 5; 325 MG/1; MG/1
1 TABLET ORAL EVERY 8 HOURS PRN
Qty: 28 TABLET | Refills: 0 | Status: SHIPPED | OUTPATIENT
Start: 2025-06-13 | End: 2025-06-27

## 2025-06-23 DIAGNOSIS — S22.080D CLOSED WEDGE COMPRESSION FRACTURE OF T12 VERTEBRA WITH ROUTINE HEALING, SUBSEQUENT ENCOUNTER: ICD-10-CM

## 2025-06-23 DIAGNOSIS — S22.080D CLOSED WEDGE COMPRESSION FRACTURE OF T11 VERTEBRA WITH ROUTINE HEALING, SUBSEQUENT ENCOUNTER: Primary | ICD-10-CM

## 2025-06-24 ENCOUNTER — TELEPHONE (OUTPATIENT)
Dept: ORTHOPEDIC SURGERY | Age: 30
End: 2025-06-24

## 2025-06-24 DIAGNOSIS — M25.511 ACUTE PAIN OF RIGHT SHOULDER: Primary | ICD-10-CM
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
## 2025-06-25 ENCOUNTER — HOSPITAL ENCOUNTER (OUTPATIENT)
Dept: GENERAL RADIOLOGY | Age: 30
Discharge: HOME | End: 2025-06-27
Payer: COMMERCIAL

## 2025-06-25 ENCOUNTER — HOSPITAL ENCOUNTER (OUTPATIENT)
Age: 30
Discharge: HOME | End: 2025-06-27

## 2025-06-25 ENCOUNTER — OFFICE VISIT (OUTPATIENT)
Age: 30
End: 2025-06-25
Payer: COMMERCIAL

## 2025-06-25 DIAGNOSIS — S22.000A COMPRESSION FRACTURE OF BODY OF THORACIC VERTEBRA (HCC): Primary | ICD-10-CM

## 2025-06-25 DIAGNOSIS — S22.080D CLOSED WEDGE COMPRESSION FRACTURE OF T12 VERTEBRA WITH ROUTINE HEALING, SUBSEQUENT ENCOUNTER: ICD-10-CM

## 2025-06-25 DIAGNOSIS — S22.080D CLOSED WEDGE COMPRESSION FRACTURE OF T11 VERTEBRA WITH ROUTINE HEALING, SUBSEQUENT ENCOUNTER: ICD-10-CM

## 2025-06-25 PROCEDURE — 72072 X-RAY EXAM THORAC SPINE 3VWS: CPT

## 2025-06-25 PROCEDURE — 99213 OFFICE O/P EST LOW 20 MIN: CPT | Performed by: PHYSICIAN ASSISTANT
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
## 2025-06-25 NOTE — TELEPHONE ENCOUNTER
Spoke with patient regarding response per Dee Dee HARDING.  Instructed him to see Neurosurgery today as they had plans to do X-rays today.  If patient desires MRI right shoulder he is to call back so it can be authorized by WC and scheduled.  Patient verbalized understanding.  Message sent to Monica regarding above.  Will inform Monica if patient calls back to request MRI Right shoulder.

## 2025-06-27 ENCOUNTER — OFFICE VISIT (OUTPATIENT)
Age: 30
End: 2025-06-27
Payer: COMMERCIAL

## 2025-06-27 VITALS
RESPIRATION RATE: 14 BRPM | OXYGEN SATURATION: 98 % | BODY MASS INDEX: 32.51 KG/M2 | SYSTOLIC BLOOD PRESSURE: 128 MMHG | DIASTOLIC BLOOD PRESSURE: 85 MMHG | HEART RATE: 75 BPM | TEMPERATURE: 97.2 F | WEIGHT: 233 LBS

## 2025-06-27 DIAGNOSIS — S22.42XD CLOSED FRACTURE OF MULTIPLE RIBS OF LEFT SIDE WITH ROUTINE HEALING, SUBSEQUENT ENCOUNTER: Primary | ICD-10-CM

## 2025-06-27 PROCEDURE — 99214 OFFICE O/P EST MOD 30 MIN: CPT | Performed by: CLINICAL NURSE SPECIALIST

## 2025-06-27 RX ORDER — METHYLPREDNISOLONE 4 MG/1
TABLET ORAL
Qty: 1 KIT | Refills: 0 | Status: SHIPPED | OUTPATIENT
Start: 2025-06-27 | End: 2025-07-03

## 2025-06-27 RX ORDER — HYDROCODONE BITARTRATE AND ACETAMINOPHEN 5; 325 MG/1; MG/1
1 TABLET ORAL EVERY 12 HOURS PRN
Qty: 14 TABLET | Refills: 0 | Status: SHIPPED | OUTPATIENT
Start: 2025-06-27 | End: 2025-07-04
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
## 2025-07-02 NOTE — TELEPHONE ENCOUNTER
Dee Dee, can we place order for MRI Right shoulder    Monica, will need submitted to  for approval

## 2025-07-03 ENCOUNTER — OFFICE VISIT (OUTPATIENT)
Age: 30
End: 2025-07-03
Payer: COMMERCIAL

## 2025-07-03 VITALS
SYSTOLIC BLOOD PRESSURE: 133 MMHG | DIASTOLIC BLOOD PRESSURE: 89 MMHG | WEIGHT: 236.4 LBS | RESPIRATION RATE: 14 BRPM | TEMPERATURE: 97.2 F | HEIGHT: 71 IN | OXYGEN SATURATION: 97 % | HEART RATE: 75 BPM | BODY MASS INDEX: 33.1 KG/M2

## 2025-07-03 DIAGNOSIS — S22.42XD CLOSED FRACTURE OF MULTIPLE RIBS OF LEFT SIDE WITH ROUTINE HEALING, SUBSEQUENT ENCOUNTER: ICD-10-CM

## 2025-07-03 PROCEDURE — 99213 OFFICE O/P EST LOW 20 MIN: CPT | Performed by: NURSE PRACTITIONER

## 2025-07-03 RX ORDER — GABAPENTIN 300 MG/1
300 CAPSULE ORAL 3 TIMES DAILY
Qty: 270 CAPSULE | Refills: 1 | Status: SHIPPED | OUTPATIENT
Start: 2025-07-03 | End: 2025-12-30

## 2025-07-03 RX ORDER — HYDROCODONE BITARTRATE AND ACETAMINOPHEN 5; 325 MG/1; MG/1
1 TABLET ORAL EVERY 12 HOURS PRN
Qty: 28 TABLET | Refills: 0 | Status: SHIPPED | OUTPATIENT
Start: 2025-07-03 | End: 2025-07-17
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
## 2025-07-24 ENCOUNTER — OFFICE VISIT (OUTPATIENT)
Age: 30
End: 2025-07-24

## 2025-07-24 VITALS
TEMPERATURE: 97.2 F | HEART RATE: 82 BPM | OXYGEN SATURATION: 98 % | BODY MASS INDEX: 32.92 KG/M2 | WEIGHT: 236 LBS | RESPIRATION RATE: 16 BRPM | DIASTOLIC BLOOD PRESSURE: 84 MMHG | SYSTOLIC BLOOD PRESSURE: 118 MMHG
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
## 2025-07-24 DIAGNOSIS — S22.081D CLOSED STABLE BURST FRACTURE OF TWELFTH THORACIC VERTEBRA WITH ROUTINE HEALING, SUBSEQUENT ENCOUNTER: ICD-10-CM

## 2025-07-24 DIAGNOSIS — S22.42XD CLOSED FRACTURE OF MULTIPLE RIBS OF LEFT SIDE WITH ROUTINE HEALING, SUBSEQUENT ENCOUNTER: ICD-10-CM

## 2025-07-24 DIAGNOSIS — S22.081D CLOSED STABLE BURST FRACTURE OF ELEVENTH THORACIC VERTEBRA WITH ROUTINE HEALING, SUBSEQUENT ENCOUNTER: ICD-10-CM

## 2025-07-24 RX ORDER — HYDROCODONE BITARTRATE AND ACETAMINOPHEN 5; 325 MG/1; MG/1
1 TABLET ORAL EVERY 12 HOURS PRN
Qty: 28 TABLET | Refills: 0 | Status: SHIPPED | OUTPATIENT
Start: 2025-07-24 | End: 2025-08-07

## 2025-07-24 ASSESSMENT — ENCOUNTER SYMPTOMS
CHEST TIGHTNESS: 0
SHORTNESS OF BREATH: 0
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
## 2025-07-24 NOTE — PATIENT INSTRUCTIONS
[facility]  Trauma Services  Additional Discharge Instructions    Call [phone] for any questions/concerns.      Please follow the instructions checked below:  Please follow up with your primary care provider.       ACTIVITY INSTRUCTIONS  Increase activity as tolerated  No heavy lifting or strenuous activity  Take your incentive spirometer home and use 4-6 times/day   [x]  No driving until cleared by Neurosurgery    WOUND/DRESSING INSTRUCTIONS:  You may shower.  No sitting in bath tub, hot tub or swimming until cleared by physician.  Ice to areas of pain for first 24 hours.  Heat to areas of pain after that.  Wash areas of lacerations/abrasions with soap & water.  Rinse well.  Pat dry with clean towel.  Apply thin layer of Bacitracin, Neosporin, or triple antibiotic cream to affected area 2-3 times per day.  Keep wounds clean and dry.    MEDICATION INSTRUCTIONS  Take medication as prescribed.  When taking pain medications, you may experience dizziness or drowsiness.  Do not drink alcohol or drive when taking these medications.  You may experience constipation while taking pain medication.  You may take over the counter stool softeners such as docusate (Colace), sennosides S (Senokot-S), or Miralax.   [x]  You may take Ibuprofen (over the counter) as directed for mild pain.  You may take up to 800 mg every 8 hours for pain, please take with food or milk.   [x]  You may take acetaminophen (Tylenol) products.  Do NOT take more than 4000mg of Tylenol in 24h.    OPIOID MEDICATION INSTRUCTIONS  Read the medication guide that is included with your prescription.  Take your medication exactly as prescribed.  Store medication away from children and in a safe place.  Do NOT share your medication with others.  Do NOT take medication unless it is prescribed for you.  Do NOT drink alcohol while taking opioids (I.e., Norco, Percocet, Oxycodone, etc).  Discuss with the Trauma Clinic staff if

## 2025-07-25 ENCOUNTER — HOSPITAL ENCOUNTER (OUTPATIENT)
Dept: MRI IMAGING | Age: 30
Discharge: HOME | End: 2025-07-27
Payer: COMMERCIAL

## 2025-07-25 DIAGNOSIS — M25.511 ACUTE PAIN OF RIGHT SHOULDER: ICD-10-CM

## 2025-07-25 PROCEDURE — 73221 MRI JOINT UPR EXTREM W/O DYE: CPT

## 2025-08-07 ENCOUNTER — OFFICE VISIT (OUTPATIENT)
Age: 30
End: 2025-08-07

## 2025-08-07 VITALS
HEIGHT: 71 IN | OXYGEN SATURATION: 97 % | WEIGHT: 240 LBS | BODY MASS INDEX: 33.6 KG/M2 | HEART RATE: 74 BPM | TEMPERATURE: 97.2 F | RESPIRATION RATE: 14 BRPM

## 2025-08-07 DIAGNOSIS — S22.081D CLOSED STABLE BURST FRACTURE OF ELEVENTH THORACIC VERTEBRA WITH ROUTINE HEALING, SUBSEQUENT ENCOUNTER: ICD-10-CM

## 2025-08-07 DIAGNOSIS — S22.42XD CLOSED FRACTURE OF MULTIPLE RIBS OF LEFT SIDE WITH ROUTINE HEALING, SUBSEQUENT ENCOUNTER: ICD-10-CM
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
## 2025-08-07 DIAGNOSIS — S43.401A SPRAIN OF RIGHT SHOULDER, UNSPECIFIED SHOULDER SPRAIN TYPE, INITIAL ENCOUNTER: ICD-10-CM

## 2025-08-07 DIAGNOSIS — S22.081D CLOSED STABLE BURST FRACTURE OF TWELFTH THORACIC VERTEBRA WITH ROUTINE HEALING, SUBSEQUENT ENCOUNTER: ICD-10-CM

## 2025-08-07 RX ORDER — IBUPROFEN 800 MG/1
800 TABLET, FILM COATED ORAL EVERY 6 HOURS PRN
Qty: 40 TABLET | Refills: 2 | Status: SHIPPED | OUTPATIENT
Start: 2025-08-07

## 2025-08-07 RX ORDER — HYDROCODONE BITARTRATE AND ACETAMINOPHEN 5; 325 MG/1; MG/1
1 TABLET ORAL EVERY 12 HOURS PRN
Qty: 28 TABLET | Refills: 0 | Status: SHIPPED | OUTPATIENT
Start: 2025-08-07 | End: 2025-08-21

## 2025-08-07 RX ORDER — METHOCARBAMOL 500 MG/1
1500 TABLET, FILM COATED ORAL 4 TIMES DAILY
Qty: 120 TABLET | Refills: 0 | Status: SHIPPED | OUTPATIENT
Start: 2025-08-07 | End: 2025-08-17

## 2025-08-14 ENCOUNTER — OFFICE VISIT (OUTPATIENT)
Dept: ORTHOPEDIC SURGERY | Age: 30
End: 2025-08-14

## 2025-08-14 VITALS
OXYGEN SATURATION: 99 % | HEART RATE: 73 BPM | DIASTOLIC BLOOD PRESSURE: 87 MMHG | SYSTOLIC BLOOD PRESSURE: 142 MMHG | TEMPERATURE: 99 F

## 2025-08-14 DIAGNOSIS — M19.019 AC JOINT ARTHROPATHY: ICD-10-CM

## 2025-08-14 DIAGNOSIS — S43.401A SPRAIN OF RIGHT SHOULDER, UNSPECIFIED SHOULDER SPRAIN TYPE, INITIAL ENCOUNTER: Primary | ICD-10-CM

## 2025-08-14 RX ORDER — BUPIVACAINE HYDROCHLORIDE 2.5 MG/ML
2 INJECTION, SOLUTION INFILTRATION; PERINEURAL ONCE
Status: COMPLETED | OUTPATIENT
Start: 2025-08-14 | End: 2025-08-14

## 2025-08-14 RX ORDER — TRIAMCINOLONE ACETONIDE 40 MG/ML
40 INJECTION, SUSPENSION INTRA-ARTICULAR; INTRAMUSCULAR ONCE
Status: COMPLETED | OUTPATIENT
Start: 2025-08-14 | End: 2025-08-14

## 2025-08-14 RX ADMIN — BUPIVACAINE HYDROCHLORIDE 5 MG: 2.5 INJECTION, SOLUTION INFILTRATION; PERINEURAL at 15:49

## 2025-08-14 RX ADMIN — TRIAMCINOLONE ACETONIDE 40 MG: 40 INJECTION, SUSPENSION INTRA-ARTICULAR; INTRAMUSCULAR at 15:50

## 2025-08-19 ENCOUNTER — TELEPHONE (OUTPATIENT)
Dept: ORTHOPEDIC SURGERY | Age: 30
End: 2025-08-19

## 2025-08-19 ENCOUNTER — TELEPHONE (OUTPATIENT)
Age: 30
End: 2025-08-19

## 2025-08-21 DIAGNOSIS — S22.42XA CLOSED FRACTURE OF MULTIPLE RIBS OF LEFT SIDE, INITIAL ENCOUNTER: ICD-10-CM

## 2025-08-21 DIAGNOSIS — S22.081A CLOSED STABLE BURST FRACTURE OF ELEVENTH THORACIC VERTEBRA, INITIAL ENCOUNTER (HCC): Primary | ICD-10-CM
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
## 2025-08-21 DIAGNOSIS — T14.90XA TRAUMA: ICD-10-CM

## 2025-08-26 ENCOUNTER — HOSPITAL ENCOUNTER (OUTPATIENT)
Dept: GENERAL RADIOLOGY | Age: 30
Discharge: HOME | End: 2025-08-28
Payer: COMMERCIAL

## 2025-08-26 ENCOUNTER — OFFICE VISIT (OUTPATIENT)
Age: 30
End: 2025-08-26
Payer: COMMERCIAL

## 2025-08-26 VITALS
DIASTOLIC BLOOD PRESSURE: 87 MMHG | OXYGEN SATURATION: 100 % | WEIGHT: 240 LBS | HEIGHT: 71 IN | HEART RATE: 66 BPM | SYSTOLIC BLOOD PRESSURE: 134 MMHG | RESPIRATION RATE: 18 BRPM | BODY MASS INDEX: 33.6 KG/M2 | TEMPERATURE: 98 F

## 2025-08-26 DIAGNOSIS — S22.000A COMPRESSION FRACTURE OF BODY OF THORACIC VERTEBRA (HCC): ICD-10-CM

## 2025-08-26 DIAGNOSIS — S22.080D CLOSED WEDGE COMPRESSION FRACTURE OF T11 VERTEBRA WITH ROUTINE HEALING, SUBSEQUENT ENCOUNTER: Primary | ICD-10-CM

## 2025-08-26 PROCEDURE — 99213 OFFICE O/P EST LOW 20 MIN: CPT | Performed by: PHYSICIAN ASSISTANT

## 2025-08-26 PROCEDURE — 72070 X-RAY EXAM THORAC SPINE 2VWS: CPT

## 2025-09-02 ENCOUNTER — TELEPHONE (OUTPATIENT)
Age: 30
End: 2025-09-02

## 2025-09-03 ENCOUNTER — TELEPHONE (OUTPATIENT)
Age: 30
End: 2025-09-03